# Patient Record
Sex: MALE | Race: WHITE | NOT HISPANIC OR LATINO | Employment: FULL TIME | URBAN - METROPOLITAN AREA
[De-identification: names, ages, dates, MRNs, and addresses within clinical notes are randomized per-mention and may not be internally consistent; named-entity substitution may affect disease eponyms.]

---

## 2017-01-20 ENCOUNTER — OFFICE VISIT (OUTPATIENT)
Dept: FAMILY MEDICINE | Facility: CLINIC | Age: 32
End: 2017-01-20
Payer: OTHER MISCELLANEOUS

## 2017-01-20 VITALS
DIASTOLIC BLOOD PRESSURE: 88 MMHG | HEIGHT: 71 IN | TEMPERATURE: 99.4 F | HEART RATE: 122 BPM | WEIGHT: 315 LBS | BODY MASS INDEX: 44.1 KG/M2 | SYSTOLIC BLOOD PRESSURE: 146 MMHG | OXYGEN SATURATION: 95 %

## 2017-01-20 DIAGNOSIS — M25.571 RIGHT ANKLE PAIN, UNSPECIFIED CHRONICITY: Primary | ICD-10-CM

## 2017-01-20 DIAGNOSIS — Z23 NEED FOR PROPHYLACTIC VACCINATION AND INOCULATION AGAINST INFLUENZA: ICD-10-CM

## 2017-01-20 PROCEDURE — 90686 IIV4 VACC NO PRSV 0.5 ML IM: CPT | Performed by: INTERNAL MEDICINE

## 2017-01-20 PROCEDURE — 99213 OFFICE O/P EST LOW 20 MIN: CPT | Mod: 25 | Performed by: INTERNAL MEDICINE

## 2017-01-20 PROCEDURE — 90471 IMMUNIZATION ADMIN: CPT | Performed by: INTERNAL MEDICINE

## 2017-01-20 NOTE — PATIENT INSTRUCTIONS
Try taking Aleve (naproxen) instead of the ibuprofen since Aleve lasts longer and might give you longer lasting pain control towards the end of the day.

## 2017-01-20 NOTE — PROGRESS NOTES
"  SUBJECTIVE:                                                    Zac Real is a 31 year old male who presents to clinic today for the following health issues:      ED/UC Followup:    Facility:  [facility]  Date of visit: 11/29/16  Reason for visit: rolled right ankle at work  Current Status: getting better, but still having some swelling, pain and hears a clicking noise.        Jensen injured his ankle rolling it after stepping off of a pallet at work.  It is overall improved but he still has some lateral ankle pain.  He did wear an ankle brace and use crutches for awhile.  Has since stopped.  He is not able to stay off his feet at work and his pain is worse at the end of the day.  He takes ibuprofen in the morning daily.    Problem list and histories reviewed & adjusted, as indicated.  Additional history: as documented    Current Outpatient Prescriptions   Medication Sig Dispense Refill     ibuprofen 200 MG capsule Take 800 mg by mouth every 4 hours as needed for fever       No Known Allergies    ROS:  Constitutional and MSK except as otherwise noted.    OBJECTIVE:                                                    /88 mmHg  Pulse 122  Temp(Src) 99.4  F (37.4  C) (Tympanic)  Ht 5' 11\" (1.803 m)  Wt 342 lb 12.8 oz (155.493 kg)  BMI 47.83 kg/m2  SpO2 95%  Body mass index is 47.83 kg/(m^2).  GENERAL: healthy, alert and no distress  MS: some pain and swelling anterior to the lateral malleolus of the right ankle, lots of clicking in the joint with movement of the ankle    Diagnostic Test Results:  none      ASSESSMENT/PLAN:                                                        1. Right ankle pain, subacute    Jensen continues to have lateral right ankle pain nearly two months after his injury.  He may benefit from PT and is agreeable to this, so referral made.  Recommended he try naproxen rather than ibuprofen since it may last longer and provide him with better pain relief at the end of his " work day.    - PHYSICAL THERAPY REFERRAL    2. Need for prophylactic vaccination and inoculation against influenza    - FLU VAC, SPLIT VIRUS IM > 3 YO (QUADRIVALENT) [59669]  - Vaccine Administration, Initial [40897]    Follow-up as needed.    Norm Marcelino MD  [facility]      Injectable Influenza Immunization Documentation    1.  Is the person to be vaccinated sick today?  No    2. Does the person to be vaccinated have an allergy to eggs or to a component of the vaccine?  No    3. Has the person to be vaccinated today ever had a serious reaction to influenza vaccine in the past?  No    4. Has the person to be vaccinated ever had Guillain-Castleton syndrome?  No  Answered by patient.    Form completed by Ines VASQUEZ CMA ([facility])

## 2017-01-20 NOTE — NURSING NOTE
"Chief Complaint   Patient presents with     ER F/U     work comp. seent 11/29/16, getting better, still has flare ups, clicking w/ rotating     Flu Shot       Initial /88 mmHg  Pulse 122  Temp(Src) 99.4  F (37.4  C) (Tympanic)  Ht 5' 11\" (1.803 m)  Wt 342 lb 12.8 oz (155.493 kg)  BMI 47.83 kg/m2  SpO2 95% Estimated body mass index is 47.83 kg/(m^2) as calculated from the following:    Height as of this encounter: 5' 11\" (1.803 m).    Weight as of this encounter: 342 lb 12.8 oz (155.493 kg).  BP completed using cuff size: X-maite VASQUEZ CMA ([facility])        "

## 2017-01-26 ENCOUNTER — OFFICE VISIT (OUTPATIENT)
Dept: FAMILY MEDICINE | Facility: CLINIC | Age: 32
End: 2017-01-26
Payer: COMMERCIAL

## 2017-01-26 VITALS
HEART RATE: 105 BPM | DIASTOLIC BLOOD PRESSURE: 78 MMHG | OXYGEN SATURATION: 95 % | TEMPERATURE: 98.5 F | WEIGHT: 315 LBS | SYSTOLIC BLOOD PRESSURE: 126 MMHG | BODY MASS INDEX: 48.17 KG/M2

## 2017-01-26 DIAGNOSIS — B34.9 VIRAL ILLNESS: Primary | ICD-10-CM

## 2017-01-26 PROCEDURE — 99213 OFFICE O/P EST LOW 20 MIN: CPT | Performed by: PHYSICIAN ASSISTANT

## 2017-01-26 ASSESSMENT — ENCOUNTER SYMPTOMS
DEPRESSION: 0
LOSS OF CONSCIOUSNESS: 0
EYE PAIN: 0
HALLUCINATIONS: 0
NERVOUS/ANXIOUS: 0
MYALGIAS: 0
EYE DISCHARGE: 0
DIZZINESS: 0
CONSTIPATION: 0
SEIZURES: 0
BLOOD IN STOOL: 0
BLURRED VISION: 0
COUGH: 1
WEIGHT LOSS: 0
HEADACHES: 0
INSOMNIA: 0
DOUBLE VISION: 0
SENSORY CHANGE: 0
WEAKNESS: 0
FEVER: 0
TINGLING: 0
NAUSEA: 0
ABDOMINAL PAIN: 0
DIARRHEA: 0
ORTHOPNEA: 0
BACK PAIN: 0
HEMOPTYSIS: 0
NEUROLOGICAL NEGATIVE: 1
FOCAL WEAKNESS: 0
HEARTBURN: 0
WHEEZING: 0
DYSURIA: 0
SPUTUM PRODUCTION: 0
NECK PAIN: 0
PHOTOPHOBIA: 0
SHORTNESS OF BREATH: 0
VOMITING: 0
SORE THROAT: 0
FREQUENCY: 0
DIAPHORESIS: 0
EYE REDNESS: 0
PALPITATIONS: 0

## 2017-01-26 ASSESSMENT — LIFESTYLE VARIABLES: SUBSTANCE_ABUSE: 0

## 2017-01-26 NOTE — NURSING NOTE
"Chief Complaint   Patient presents with     Cough     /78 mmHg  Pulse 105  Temp(Src) 98.5  F (36.9  C) (Tympanic)  Wt 345 lb 3.2 oz (156.582 kg)  SpO2 95% Estimated body mass index is 48.17 kg/(m^2) as calculated from the following:    Height as of 1/20/17: 5' 11\" (1.803 m).    Weight as of this encounter: 345 lb 3.2 oz (156.582 kg).  bp completed using cuff size: large      Health Maintenance that is potentially due pending provider review:  NONE    N/a  Kaden Ramirez M.A.    "

## 2017-01-26 NOTE — PROGRESS NOTES
HPI    SUBJECTIVE:                                                    Zac Real is a 31 year old male who presents to clinic today for cough that began yesterday but he has a coworker who had recently been diagnosed with bacterial bronchitis and he was concerned that he may be developing that. He denies shortness of breath, wheezing or other problems. He states he has a slight sensation in his ears.      ENT Symptoms             Symptoms: cc Present Absent Comment   Fever/Chills  x  Felt warm this morning   Fatigue  x     Muscle Aches       Eye Irritation       Sneezing       Nasal Maximino/Drg  x  Slightly this morning.  Feels a little congested   Sinus Pressure/Pain  x  Little pressure    Loss of smell       Dental pain       Sore Throat       Swollen Glands       Ear Pain/Fullness  x  Fullness for a while, some tingling    Cough x   Slight mucous   Wheeze       Chest Pain  x  Tingling when he coughs   Shortness of breath  x     Rash       Other         Symptom duration:  Yesterday    Symptom severity: worse   Treatments tried:  Mucinex this morning    Contacts:  Co-worker has viral bronchitis             Problem list and histories reviewed & adjusted, as indicated.  Additional history: as documented    Patient Active Problem List   Diagnosis     Elevated blood pressure reading without diagnosis of hypertension     Tobacco abuse     Morbid obesity (H)     Past Surgical History   Procedure Laterality Date     Nissen fundoplication         Social History   Substance Use Topics     Smoking status: Current Some Day Smoker     Types: Cigarettes, Cigars, Dip, chew, snus or snuff     Smokeless tobacco: Current User     Types: Chew     Alcohol Use: Yes      Comment: weekly     Family History   Problem Relation Age of Onset     Unknown/Adopted Father      Brain Tumor Maternal Grandmother 69     DIABETES Maternal Grandfather          Current Outpatient Prescriptions   Medication Sig Dispense Refill     ibuprofen  200 MG capsule Take 800 mg by mouth every 4 hours as needed for fever       No Known Allergies  Problem list, Medication list, Allergies, and Medical/Social/Surgical histories reviewed in [facility] and updated as appropriate.          Review of Systems   Constitutional: Negative for fever, weight loss, malaise/fatigue and diaphoresis.   HENT: Negative for congestion, ear discharge, ear pain, hearing loss, nosebleeds and sore throat.    Eyes: Negative for blurred vision, double vision, photophobia, pain, discharge and redness.   Respiratory: Positive for cough. Negative for hemoptysis, sputum production, shortness of breath and wheezing.    Cardiovascular: Negative for chest pain, palpitations, orthopnea and leg swelling.   Gastrointestinal: Negative for heartburn, nausea, vomiting, abdominal pain, diarrhea, constipation, blood in stool and melena.   Genitourinary: Negative.  Negative for dysuria, urgency and frequency.   Musculoskeletal: Negative for myalgias, back pain, joint pain and neck pain.   Skin: Negative for itching and rash.   Neurological: Negative.  Negative for dizziness, tingling, sensory change, focal weakness, seizures, loss of consciousness, weakness and headaches.   Endo/Heme/Allergies: Negative.    Psychiatric/Behavioral: Negative for depression, suicidal ideas, hallucinations and substance abuse. The patient is not nervous/anxious and does not have insomnia.          Physical Exam   Constitutional: He is oriented to person, place, and time and well-developed, well-nourished, and in no distress.   HENT:   Head: Normocephalic and atraumatic.   Right Ear: External ear normal.   Left Ear: External ear normal.   Nose: Nose normal.   Mouth/Throat: Oropharynx is clear and moist.   Bilateral cerumen impactions, irrigated until clear.   Eyes: Conjunctivae and EOM are normal. Pupils are equal, round, and reactive to light. Right eye exhibits no discharge. Left eye exhibits no discharge. No scleral icterus.    Neck: Normal range of motion. Neck supple. No thyromegaly present.   Cardiovascular: Normal rate, regular rhythm, normal heart sounds and intact distal pulses.  Exam reveals no gallop and no friction rub.    No murmur heard.  Pulmonary/Chest: Effort normal and breath sounds normal. No respiratory distress. He has no wheezes. He has no rales. He exhibits no tenderness.   Abdominal: Soft. Bowel sounds are normal. He exhibits no distension and no mass. There is no tenderness. There is no rebound and no guarding.   Musculoskeletal: Normal range of motion. He exhibits no edema or tenderness.   Lymphadenopathy:     He has no cervical adenopathy.   Neurological: He is alert and oriented to person, place, and time. He has normal reflexes. No cranial nerve deficit. He exhibits normal muscle tone. Gait normal. Coordination normal.   Skin: Skin is warm and dry. No rash noted. No erythema.   Psychiatric: Mood, memory, affect and judgment normal.       Assessment: Viral illness    Plan: Symptomatic measures and follow up if not improving

## 2017-02-06 ENCOUNTER — OFFICE VISIT (OUTPATIENT)
Dept: FAMILY MEDICINE | Facility: CLINIC | Age: 32
End: 2017-02-06
Payer: COMMERCIAL

## 2017-02-06 VITALS
SYSTOLIC BLOOD PRESSURE: 140 MMHG | WEIGHT: 315 LBS | BODY MASS INDEX: 44.1 KG/M2 | HEIGHT: 71 IN | OXYGEN SATURATION: 96 % | DIASTOLIC BLOOD PRESSURE: 100 MMHG | TEMPERATURE: 97.7 F | HEART RATE: 89 BPM

## 2017-02-06 DIAGNOSIS — J40 BRONCHITIS: Primary | ICD-10-CM

## 2017-02-06 PROCEDURE — 99213 OFFICE O/P EST LOW 20 MIN: CPT | Performed by: FAMILY MEDICINE

## 2017-02-06 RX ORDER — CODEINE PHOSPHATE AND GUAIFENESIN 10; 100 MG/5ML; MG/5ML
1-2 SOLUTION ORAL EVERY 4 HOURS PRN
Qty: 120 ML | Refills: 0 | Status: SHIPPED | OUTPATIENT
Start: 2017-02-06 | End: 2017-10-08

## 2017-02-06 RX ORDER — PREDNISONE 20 MG/1
TABLET ORAL
Qty: 20 TABLET | Refills: 0 | Status: SHIPPED | OUTPATIENT
Start: 2017-02-06 | End: 2017-10-08

## 2017-02-06 NOTE — PROGRESS NOTES
SUBJECTIVE:                                                    Zac Real is a 31 year old male who presents to clinic today for the following health issues:      Acute Illness   Acute illness concerns: cough   Onset: 1.5 weeks      Fever: YES- slight     Chills/Sweats: no     Headache (location?): no     Sinus Pressure:no    Conjunctivitis:  no    Ear Pain: no    Rhinorrhea: YES- little    Congestion: YES- chest     Sore Throat: YES- from cough     Cough: YES-non-productive, productive of clear sputum, with shortness of breath    Wheeze: YES    Decreased Appetite: yes    Nausea: no     Vomiting: no     Diarrhea:  YES- slight     Dysuria/Freq.: no     Fatigue/Achiness: YES    Sick/Strep Exposure: YES- work family     Therapies Tried and outcome: mucinex ibuprofen       Patient is a 31 yr old male who presents with a week of cough and nasal congestion. Cough is mostly dry but on occasion there is sputum production. He also reports wheezing . He reports some mild shortness of breath. No fevers or chills. He has had contacts with persons with similar illness. No other symptoms reported. He reports no history of asthma but he chews tobacco.    Problem list and histories reviewed & adjusted, as indicated.  Additional history: as documented    Patient Active Problem List   Diagnosis     Elevated blood pressure reading without diagnosis of hypertension     Tobacco abuse     Morbid obesity (H)     Past Surgical History   Procedure Laterality Date     Nissen fundoplication         Social History   Substance Use Topics     Smoking status: Current Some Day Smoker     Types: Cigarettes, Cigars, Dip, chew, snus or snuff     Smokeless tobacco: Current User     Types: Chew     Alcohol Use: Yes      Comment: weekly     Family History   Problem Relation Age of Onset     Unknown/Adopted Father      Brain Tumor Maternal Grandmother 69     DIABETES Maternal Grandfather          Current Outpatient Prescriptions  "  Medication Sig Dispense Refill     predniSONE (DELTASONE) 20 MG tablet Take 3 tabs (60 mg) by mouth daily x 3 days, 2 tabs (40 mg) daily x 3 days, 1 tab (20 mg) daily x 3 days, then 1/2 tab (10 mg) x 3 days. 20 tablet 0     guaiFENesin-codeine (ROBITUSSIN AC) 100-10 MG/5ML SOLN solution Take 5-10 mLs by mouth every 4 hours as needed 120 mL 0     ibuprofen 200 MG capsule Take 800 mg by mouth every 4 hours as needed for fever       No Known Allergies  BP Readings from Last 3 Encounters:   02/06/17 139/94   01/26/17 126/78   01/20/17 146/88    Wt Readings from Last 3 Encounters:   02/06/17 346 lb (156.945 kg)   01/26/17 345 lb 3.2 oz (156.582 kg)   01/20/17 342 lb 12.8 oz (155.493 kg)                  Labs reviewed in [facility]  Problem list, Medication list, Allergies, and Medical/Social/Surgical histories reviewed in [facility] and updated as appropriate.    ROS:  C: NEGATIVE for fever, chills, change in weight  E/M: NEGATIVE for ear, mouth and throat problems  RESP:POSITIVE for cough-non productive and wheezing  CV: NEGATIVE for chest pain, palpitations or peripheral edema    OBJECTIVE:                                                    /94 mmHg  Pulse 89  Temp(Src) 97.7  F (36.5  C) (Tympanic)  Ht 5' 11\" (1.803 m)  Wt 346 lb (156.945 kg)  BMI 48.28 kg/m2  SpO2 96%  Body mass index is 48.28 kg/(m^2).  GENERAL: healthy, alert and no distress  NECK: no adenopathy, no asymmetry, masses, or scars and thyroid normal to palpation  RESP: expiratory wheezes throughout  CV: regular rate and rhythm, normal S1 S2, no S3 or S4, no murmur, click or rub, no peripheral edema and peripheral pulses strong  ABDOMEN: soft, nontender, no hepatosplenomegaly, no masses and bowel sounds normal  MS: no gross musculoskeletal defects noted, no edema    Diagnostic Test Results:  none      ASSESSMENT/PLAN:                                                    1. Bronchitis  Likely viral, if symptoms do not improve by late in the week, patient " asked to call for antibiotics.  - predniSONE (DELTASONE) 20 MG tablet; Take 3 tabs (60 mg) by mouth daily x 3 days, 2 tabs (40 mg) daily x 3 days, 1 tab (20 mg) daily x 3 days, then 1/2 tab (10 mg) x 3 days.  Dispense: 20 tablet; Refill: 0  - guaiFENesin-codeine (ROBITUSSIN AC) 100-10 MG/5ML SOLN solution; Take 5-10 mLs by mouth every 4 hours as needed  Dispense: 120 mL; Refill: 0    FUTURE APPOINTMENTS:       - Follow-up visit as needed.    BP is elevated. Asked to return in a week or two for a recheck.     Jorge Ochoa MD  [facility]

## 2017-02-06 NOTE — PATIENT INSTRUCTIONS
Thank you for choosing [facility].  You may be receiving a survey in the mail from [employer] regarding your visit today.  Please take a few minutes to complete and return the survey to let us know how we are doing.      If you have questions or concerns, please contact us via [employer] or you can contact your care team at [phone].    Our Clinic hours are:  Monday 6:40 am  to 7:00 pm  Tuesday -Friday 6:40 am to 5:00 pm    The [place] outpatient lab hours are:  Monday - Friday 6:10 am to 4:45 pm  Saturdays 7:00 am to 11:00 am  Appointments are required, call [phone]    If you have clinical questions after hours or would like to schedule an appointment,  call the clinic at [phone].  Bronchitis with Wheezing (Viral or Bacterial: Adult)    Bronchitis is an infection of the air passages. It often occurs during the common cold and is usually caused by a virus. Symptoms include cough with mucus (phlegm) and low-grade fever. This illness is contagious during the first few days and is spread through the air by coughing and sneezing, or by direct contact (touching the sick person and then touching your own eyes, nose, or mouth).  If there is a lot of inflammation, air flow is restricted. The air passages may also go into spasm, especially if you have asthma. This causes wheezing and difficulty breathing even in people who do not have asthma.  Bronchitis usually lasts 7 to 14 days. The wheezing should improve with treatment during the first week. An inhaler is often prescribed to relax the air passages and stop wheezing. Antibiotics will be prescribed if your doctor thinks there is also a secondary bacterial infection.  Home care    If symptoms are severe, rest at home for the first 2 to 3 days. When you go back to your usual activities, don't let yourself get too tired.    Do not smoke. Also avoid being exposed to secondhand smoke.    You may use over-the-counter medicine to control fever or  pain, unless another medicine was prescribed. Note: If you have chronic liver or kidney disease or have ever had a stomach ulcer or gastrointestinal bleeding, talk with your healthcare provider before using these medicines. Also talk to your provider if you are taking medicine to prevent blood clots.) Aspirin should never be given to anyone younger than 18 years of age who is ill with a viral infection or fever. It may cause severe liver or brain damage.    Your appetite may be poor, so a light diet is fine. Avoid dehydration by drinking 6 to 8 glasses of fluids per day (such as water, soft drinks, sports drinks, juices, tea, or soup). Extra fluids will help loosen secretions in the nose and lungs.    Over-the-counter cough, cold, and sore-throat medicines will not shorten the length of the illness, but they may be helpful to reduce symptoms. (Note: Do not use decongestants if you have high blood pressure.)    If you were given an inhaler, use it exactly as directed. If you need to use it more often than prescribed, your condition may be worsening. If this happens, contact your healthcare provider.    If prescribed, finish all antibiotic medicine, even if you are feeling better after only a few days.  Follow-up care  Follow up with your healthcare provider, or as advised. If you had an X-ray or ECG (electrocardiogram), a specialist will review it. You will be notified of any new findings that may affect your care.  Note: If you are age 65 or older, or if you have a chronic lung disease or condition that affects your immune system, or you smoke, talk to your healthcare provider about having a pneumococcal vaccinations and a yearly influenza vaccination (flu shot).  When to seek medical advice   Call your healthcare provider right away if any of these occur:    Fever of 100.4 F (38 C) or higher    Coughing up increasing amounts of colored sputum    Weakness, drowsiness, headache, facial pain, ear pain, or a stiff  neck  Call 911, or get immediate medical care  Contact emergency services right away if any of these occur.    Coughing up blood    Worsening weakness, drowsiness, headache, or stiff neck    Increased wheezing not helped with medication, shortness of breath, or pain with breathing    0650-4316 [employer]. [address] [place] [id number]. All rights reserved. This information is not intended as a substitute for professional medical care. Always follow your healthcare professional's instructions.

## 2017-02-06 NOTE — MR AVS SNAPSHOT
After Visit Summary   2/6/2017    Zac Real    MRN: [id number]           Patient Information     Date Of Birth          1985        Visit Information        Provider Department      2/6/2017 7:00 AM Jorge Ochoa MD [facility]        Today's Diagnoses     Bronchitis    -  1       Care Instructions          Thank you for choosing [facility].  You may be receiving a survey in the mail from [employer] regarding your visit today.  Please take a few minutes to complete and return the survey to let us know how we are doing.      If you have questions or concerns, please contact us via [employer] or you can contact your care team at [phone].    Our Clinic hours are:  Monday 6:40 am  to 7:00 pm  Tuesday -Friday 6:40 am to 5:00 pm    The [place] outpatient lab hours are:  Monday - Friday 6:10 am to 4:45 pm  Saturdays 7:00 am to 11:00 am  Appointments are required, call [phone]    If you have clinical questions after hours or would like to schedule an appointment,  call the clinic at [phone].  Bronchitis with Wheezing (Viral or Bacterial: Adult)    Bronchitis is an infection of the air passages. It often occurs during the common cold and is usually caused by a virus. Symptoms include cough with mucus (phlegm) and low-grade fever. This illness is contagious during the first few days and is spread through the air by coughing and sneezing, or by direct contact (touching the sick person and then touching your own eyes, nose, or mouth).  If there is a lot of inflammation, air flow is restricted. The air passages may also go into spasm, especially if you have asthma. This causes wheezing and difficulty breathing even in people who do not have asthma.  Bronchitis usually lasts 7 to 14 days. The wheezing should improve with treatment during the first week. An inhaler is often prescribed to relax the air passages and stop wheezing. Antibiotics will be  prescribed if your doctor thinks there is also a secondary bacterial infection.  Home care    If symptoms are severe, rest at home for the first 2 to 3 days. When you go back to your usual activities, don't let yourself get too tired.    Do not smoke. Also avoid being exposed to secondhand smoke.    You may use over-the-counter medicine to control fever or pain, unless another medicine was prescribed. Note: If you have chronic liver or kidney disease or have ever had a stomach ulcer or gastrointestinal bleeding, talk with your healthcare provider before using these medicines. Also talk to your provider if you are taking medicine to prevent blood clots.) Aspirin should never be given to anyone younger than 18 years of age who is ill with a viral infection or fever. It may cause severe liver or brain damage.    Your appetite may be poor, so a light diet is fine. Avoid dehydration by drinking 6 to 8 glasses of fluids per day (such as water, soft drinks, sports drinks, juices, tea, or soup). Extra fluids will help loosen secretions in the nose and lungs.    Over-the-counter cough, cold, and sore-throat medicines will not shorten the length of the illness, but they may be helpful to reduce symptoms. (Note: Do not use decongestants if you have high blood pressure.)    If you were given an inhaler, use it exactly as directed. If you need to use it more often than prescribed, your condition may be worsening. If this happens, contact your healthcare provider.    If prescribed, finish all antibiotic medicine, even if you are feeling better after only a few days.  Follow-up care  Follow up with your healthcare provider, or as advised. If you had an X-ray or ECG (electrocardiogram), a specialist will review it. You will be notified of any new findings that may affect your care.  Note: If you are age 65 or older, or if you have a chronic lung disease or condition that affects your immune system, or you smoke, talk to your  "healthcare provider about having a pneumococcal vaccinations and a yearly influenza vaccination (flu shot).  When to seek medical advice   Call your healthcare provider right away if any of these occur:    Fever of 100.4 F (38 C) or higher    Coughing up increasing amounts of colored sputum    Weakness, drowsiness, headache, facial pain, ear pain, or a stiff neck  Call 911, or get immediate medical care  Contact emergency services right away if any of these occur.    Coughing up blood    Worsening weakness, drowsiness, headache, or stiff neck    Increased wheezing not helped with medication, shortness of breath, or pain with breathing    1108-5728 [employer]. [address] [place] [address]. All rights reserved. This information is not intended as a substitute for professional medical care. Always follow your healthcare professional's instructions.              Follow-ups after your visit        Who to contact     If you have questions or need follow up information about today's clinic visit or your schedule please contact [facility] directly at [phone].  Normal or non-critical lab and imaging results will be communicated to you by [employer], letter or phone within 4 business days after the clinic has received the results. If you do not hear from us within 7 days, please contact the clinic through [employer] or phone. If you have a critical or abnormal lab result, we will notify you by phone as soon as possible.  Submit refill requests through [employer] or call your pharmacy and they will forward the refill request to us. Please allow 3 business days for your refill to be completed.          Additional Information About Your Visit        [employer] Information     [employer] lets you send messages to your doctor, view your test results, renew your prescriptions, schedule appointments and more. To sign up, go to [email or web address] . Click on \"Log in\" on the left side of the " "screen, which will take you to the Welcome page. Then click on \"Sign up Now\" on the right side of the page.     You will be asked to enter the access code listed below, as well as some personal information. Please follow the directions to create your username and password.     Your access code is: [email or web address]  Expires: 2017  3:57 PM     Your access code will  in 90 days. If you need help or a new code, please call your [facility] or [phone].        Care EveryWhere ID     This is your Care EveryWhere ID. This could be used by other organizations to access your [facility] medical records  [id number]        Your Vitals Were     Pulse Temperature Height BMI (Body Mass Index) Pulse Oximetry       89 97.7  F (36.5  C) (Tympanic) 5' 11\" (1.803 m) 48.28 kg/m2 96%        Blood Pressure from Last 3 Encounters:   17 139/94   17 126/78   17 146/88    Weight from Last 3 Encounters:   17 346 lb (156.945 kg)   17 345 lb 3.2 oz (156.582 kg)   17 342 lb 12.8 oz (155.493 kg)              Today, you had the following     No orders found for display         Today's Medication Changes          These changes are accurate as of: 17  7:31 AM.  If you have any questions, ask your nurse or doctor.               Start taking these medicines.        Dose/Directions    guaiFENesin-codeine 100-10 MG/5ML Soln solution   Commonly known as:  ROBITUSSIN AC   Used for:  Bronchitis   Started by:  Jorge Ochoa MD        Dose:  1-2 tsp.   Take 5-10 mLs by mouth every 4 hours as needed   Quantity:  120 mL   Refills:  0       predniSONE 20 MG tablet   Commonly known as:  DELTASONE   Used for:  Bronchitis   Started by:  Jorge Ochoa MD        Take 3 tabs (60 mg) by mouth daily x 3 days, 2 tabs (40 mg) daily x 3 days, 1 tab (20 mg) daily x 3 days, then 1/2 tab (10 mg) x 3 days.   Quantity:  20 tablet   Refills:  0            Where to get your medicines      These " medications were sent to [facility] [place] - [place], [place] - [address]  [address] [address]     Phone:  [phone]    - predniSONE 20 MG tablet      Some of these will need a paper prescription and others can be bought over the counter.  Ask your nurse if you have questions.     Bring a paper prescription for each of these medications    - guaiFENesin-codeine 100-10 MG/5ML Soln solution             Primary Care Provider Office Phone # Fax #    Hank Nascimento [phone]       [facility] [address] [address]        Thank you!     Thank you for choosing [facility]  for your care. Our goal is always to provide you with excellent care. Hearing back from our patients is one way we can continue to improve our services. Please take a few minutes to complete the written survey that you may receive in the mail after your visit with us. Thank you!             Your Updated Medication List - Protect others around you: Learn how to safely use, store and throw away your medicines at www.disposemymeds.org.          This list is accurate as of: 2/6/17  7:31 AM.  Always use your most recent med list.                   Brand Name Dispense Instructions for use    guaiFENesin-codeine 100-10 MG/5ML Soln solution    ROBITUSSIN AC    120 mL    Take 5-10 mLs by mouth every 4 hours as needed       ibuprofen 200 MG capsule      Take 800 mg by mouth every 4 hours as needed for fever       predniSONE 20 MG tablet    DELTASONE    20 tablet    Take 3 tabs (60 mg) by mouth daily x 3 days, 2 tabs (40 mg) daily x 3 days, 1 tab (20 mg) daily x 3 days, then 1/2 tab (10 mg) x 3 days.

## 2017-02-06 NOTE — NURSING NOTE
"Chief Complaint   Patient presents with     URI       Initial /94 mmHg  Pulse 89  Temp(Src) 97.7  F (36.5  C) (Tympanic)  Ht 5' 11\" (1.803 m)  Wt 346 lb (156.945 kg)  BMI 48.28 kg/m2  SpO2 96% Estimated body mass index is 48.28 kg/(m^2) as calculated from the following:    Height as of this encounter: 5' 11\" (1.803 m).    Weight as of this encounter: 346 lb (156.945 kg).  Medication Reconciliation: complete  "

## 2017-02-20 ENCOUNTER — TELEPHONE (OUTPATIENT)
Dept: FAMILY MEDICINE | Facility: CLINIC | Age: 32
End: 2017-02-20

## 2017-02-20 NOTE — LETTER
[facility]  [address]  [facility] [address]  [phone]    February 22, 2017    Zac SHIN Farhan  [address] [address]      Dear Mr. Real,    During a recent visit to our clinic, your blood pressure was elevated above the target range for your health.  Because untreated high blood pressure could have a negative effect on your long term health.  Please schedule a free follow up blood pressure visit with one of our nurses by calling [phone].      Why is high blood pressure a big deal?      If blood pressure is elevated above target levels you have an increased risk of experiencing a heart attack or stroke, developing heart failure, kidney disease or other chronic diseases.    With appropriate early recognition and treatment, these health problems can be avoided in most cases.  Your physician can help you determine the need for treatment and discuss the non-medication and medication routes to treating high blood pressure as well as evaluate you for possible causes and effects of high blood pressure.    The target range for ideal blood pressure control is less than 140/90 for most individuals.  For those who have been diagnosed with heart disease, diabetes, stroke or peripheral vascular disease this target range is less than 130/80.          Dr. Cannon/HUMBERTO

## 2017-02-20 NOTE — TELEPHONE ENCOUNTER
----- Message from Jorge Ochoa MD sent at 2/6/2017  7:38 AM CST -----  Regarding: BP recheck  Please call patient for a BP recheck . Thanks.     Dr Ochoa

## 2017-10-08 ENCOUNTER — APPOINTMENT (OUTPATIENT)
Dept: CT IMAGING | Facility: CLINIC | Age: 32
End: 2017-10-08
Attending: EMERGENCY MEDICINE
Payer: COMMERCIAL

## 2017-10-08 ENCOUNTER — HOSPITAL ENCOUNTER (EMERGENCY)
Facility: CLINIC | Age: 32
Discharge: HOME | End: 2017-10-08
Attending: EMERGENCY MEDICINE | Admitting: EMERGENCY MEDICINE
Payer: COMMERCIAL

## 2017-10-08 VITALS
OXYGEN SATURATION: 96 % | TEMPERATURE: 98.7 F | SYSTOLIC BLOOD PRESSURE: 166 MMHG | HEART RATE: 120 BPM | BODY MASS INDEX: 50.21 KG/M2 | DIASTOLIC BLOOD PRESSURE: 113 MMHG | WEIGHT: 315 LBS | RESPIRATION RATE: 16 BRPM

## 2017-10-08 DIAGNOSIS — R22.0 LEFT FACIAL SWELLING: ICD-10-CM

## 2017-10-08 DIAGNOSIS — R51.9 NONINTRACTABLE EPISODIC HEADACHE, UNSPECIFIED HEADACHE TYPE: ICD-10-CM

## 2017-10-08 PROCEDURE — 99284 EMERGENCY DEPT VISIT MOD MDM: CPT | Mod: Z6 | Performed by: EMERGENCY MEDICINE

## 2017-10-08 PROCEDURE — 70450 CT HEAD/BRAIN W/O DYE: CPT

## 2017-10-08 PROCEDURE — 99284 EMERGENCY DEPT VISIT MOD MDM: CPT | Mod: 25 | Performed by: EMERGENCY MEDICINE

## 2017-10-08 PROCEDURE — 70486 CT MAXILLOFACIAL W/O DYE: CPT

## 2017-10-08 NOTE — DISCHARGE INSTRUCTIONS
* HEADACHE [unspecified]    The cause of your headache today is not clear, but it does not appear to be the sign of any serious illness.  Under stress, some people tense the muscles of their shoulder, neck and scalp without knowing it. If this condition lasts long enough, a TENSION HEADACHE can occur.  A MIGRAINE HEADACHE is caused by changes in blood flow to the brain. It can be mild or severe. A migraine attack may be triggered by emotional stress, hormone changes during the menstrual cycle, oral contraceptives, alcohol use, certain foods containing tyramine, eye strain, weather changes, missing meals, lack of sleep or oversleeping.  Other causes of headache include a viral illness, sinus, ear or throat infection, dental pain and TMJ (jaw joint) pain.  HOME CARE:    If you were given pain medicine for this headache, do not drive yourself home. Arrange for a ride, instead. When you get home, try to sleep. You should feel much better when you wake up.    If you are having nausea or vomiting, follow a light diet until your headache is relieved.    If you have a migraine type headache, use sunglasses when in the daylight or around bright indoor lighting until symptoms improve. Bright glaring light can worsen this kind of headache.  FOLLOW UP with your doctor if the headache is not better within the next 24 hours. If you have frequent headaches you should discuss a treatment plan with your primary care doctor. By being aware of the earliest signs of headache, and starting treatment right away, you may be able to stop the pain yourself.  GET PROMPT MEDICAL ATTENTION if any of the following occur:    Worsening of your head pain or no improvement within 24 hours    Repeated vomiting (unable to keep liquids down)    Fever over 101 F (38.3 C)    Stiff neck    Extreme drowsiness, confusion or fainting    Weakness of an arm or leg or one side of the face    Difficulty with speech or vision    6550-0257 [address]  [place] [address]. All rights reserved. This information is not intended as a substitute for professional medical care. Always follow your healthcare professional's instructions.

## 2017-10-08 NOTE — ED AVS SNAPSHOT
[facility]    [address] [address]    Phone:  [phone]    Fax:  [phone]                                       Zac Real   MRN: [id number]    Department:  [facility]   Date of Visit:  10/8/2017           After Visit Summary Signature Page     I have received my discharge instructions, and my questions have been answered. I have discussed any challenges I see with this plan with the nurse or doctor.    ..........................................................................................................................................  Patient/Patient Representative Signature      ..........................................................................................................................................  Patient Representative Print Name and Relationship to Patient    ..................................................               ................................................  Date                                            Time    ..........................................................................................................................................  Reviewed by Signature/Title    ...................................................              ..............................................  Date                                                            Time

## 2017-10-08 NOTE — ED PROVIDER NOTES
History     Chief Complaint   Patient presents with     Headache     started at 11 am today, now with facial pain, swelling     HPI  Zac Real is a 32 year old male who developed a left frontal headache approximately 3 hours ago that approximate 1 hour ago developed left facial swelling, redness and warmth.  Symptoms now spontaneously improved.  Headache is described as insidious in onset, constant, pressure-like, localized to the left frontal area and nonradiating.  No associated photophobia, nausea, vomiting, visual or neurologic deficit or abnormality.  No history of prior similar symptoms or migraine headaches.  No fever, chills, URI signs or symptoms, nasal congestion or drainage.  No dental pain.  No neck pain, neck swelling, neck stiffness.  No ear pain.  No other acute complaints or concerns.    I have reviewed the Medications, Allergies, Past Medical and Surgical History, and Social History in the Epic system.  Patient Active Problem List   Diagnosis     Elevated blood pressure reading without diagnosis of hypertension     Tobacco abuse     Morbid obesity (H)     History reviewed. No pertinent past medical history.  Past Surgical History:   Procedure Laterality Date     NISSEN FUNDOPLICATION       No current facility-administered medications for this encounter.      Current Outpatient Prescriptions   Medication     amoxicillin-clavulanate (AUGMENTIN) 875-125 MG per tablet     ibuprofen 200 MG capsule     No Known Allergies  Social History   Substance Use Topics     Smoking status: Current Some Day Smoker     Types: Cigarettes, Cigars, Dip, chew, snus or snuff     Smokeless tobacco: Current User     Types: Chew     Alcohol use Yes      Comment: weekly     Family History   Problem Relation Age of Onset     Unknown/Adopted Father      Brain Tumor Maternal Grandmother 69     DIABETES Maternal Grandfather        Review of Systems  As mentioned above in the history present illness.  All other  systems were reviewed and are negative.    Physical Exam   BP: (!) 166/113  Pulse: 120  Temp: 98.4  F (36.9  C)  Resp: 16  Weight: (!) 163.3 kg (360 lb)  SpO2: 96 %    Physical Exam   Constitutional: He is oriented to person, place, and time. He appears well-developed and well-nourished. No distress.   HENT:   Head: Normocephalic and atraumatic.   Right Ear: External ear normal.   Left Ear: External ear normal.   Nose: Nose normal.   Mouth/Throat: Oropharynx is clear and moist. No oropharyngeal exudate.   Eyes: Conjunctivae and EOM are normal. Pupils are equal, round, and reactive to light. No scleral icterus.   Neck: Normal range of motion and full passive range of motion without pain. Neck supple. Normal carotid pulses and no JVD present. Carotid bruit is not present. No rigidity. No tracheal deviation and normal range of motion present. No thyromegaly present.   Cardiovascular: Normal rate, regular rhythm, normal heart sounds and intact distal pulses.  Exam reveals no gallop and no friction rub.    No murmur heard.  Pulmonary/Chest: Effort normal and breath sounds normal. No respiratory distress. He has no wheezes. He has no rales.   Abdominal: Soft. Bowel sounds are normal. He exhibits no distension and no mass. There is no tenderness. There is no rebound and no guarding.   Musculoskeletal: Normal range of motion. He exhibits no edema or tenderness.   Neurological: He is alert and oriented to person, place, and time. He has normal strength. No cranial nerve deficit (2-12 intact). Coordination and gait normal.   Skin: Skin is warm and dry. No rash noted. He is not diaphoretic. No erythema. No pallor.   Psychiatric: He has a normal mood and affect. His behavior is normal.   Nursing note and vitals reviewed.      ED Course     ED Course     Procedures        Results for orders placed or performed during the hospital encounter of 10/08/17   CT Head w/o Contrast    Narrative    CT HEAD W/O CONTRAST   10/8/2017 5:41  PM     HISTORY: Left frontal headache    TECHNIQUE:  Axial images of the head without IV contrast material.  Radiation dose for this scan was reduced using automated exposure  control, adjustment of the mA and/or kV according to patient size, or  iterative reconstruction technique.    COMPARISON: None.    FINDINGS: The ventricles are normal in size, shape and configuration.  The brain parenchyma and subarachnoid spaces are normal. There is no  evidence of intracranial hemorrhage, mass, acute infarct or anomaly.  The visualized portions of the sinuses and mastoids appear normal.  There is no evidence of trauma.      Impression    IMPRESSION:  No acute pathology, no bleed, mass, or infarcts are seen.    PHIL BORJA MD   CT Maxillofacial w/o Contrast    Narrative     CT MAXILLOFACIAL W/O CONTRAST 10/8/2017 5:41 PM     HISTORY:  Left facial pain and swelling    TECHNIQUE:   Noncontrast axial scans and coronal reformations or axial  and direct coronal scans of the face were obtained.  Radiation dose  for this scan was reduced using automated exposure control, adjustment  of the mA and/or kV according to patient size, or iterative  reconstruction technique.    COMPARISON:  None.    FINDINGS:  The frontal sinuses, ethmoid sinuses, and sphenoid sinuses  are clear. Mild mucosal thickening is present in the left maxillary  sinus. No air-fluid level is seen. Trace mucosal thickening right  maxillary sinus. No significant soft tissue swelling is identified.      Impression    IMPRESSION:   1. Mild mucosal thickening is seen in the maxillary sinuses. No  air-fluid levels are seen.  2. No soft tissue edema is identified on these images.    PHIL BORJA MD          Labs Ordered and Resulted from Time of ED Arrival Up to the Time of Departure from the ED - No data to display    Medications - No data to display   Patient declined analgesic.  Headache is very mild at the present time.    Assessments & Plan (with Medical Decision Making)    Headache and transient left lower facial swelling and flushing with spontaneous improvement and near resolution of headache.  Neurologically intact with normal physical exam.  Symptoms may be secondary to sinusitis with only pertinent imaging finding on CT scan of the head and maxillofacial areas being mild mucosal thickening in the maxillary sinuses, L>R. Will Rx Augmentin.  Doubt emergent disease process such as aneurysm/dissection, CVA, ICH/SAH, etc.  Patient will use Tylenol and Ibuprofen prn and recheck in clinic if symptoms not resolving over next several days. Patient was provided instructions for supportive care and will return as needed for worsened condition or worsening symptoms, or new problems or concerns.    I have reviewed the nursing notes.    I have reviewed the findings, diagnosis, plan and need for follow up with the patient.      Discharge Medication List as of 10/8/2017  6:17 PM      START taking these medications    Details   amoxicillin-clavulanate (AUGMENTIN) 875-125 MG per tablet Take 1 tablet by mouth 2 times daily for 10 days, Disp-20 tablet, R-0, E-Prescribe             Final diagnoses:   Nonintractable episodic headache, unspecified headache type   Left facial swelling       10/8/2017   [facility] EMERGENCY DEPARTMENT     Angel Luis Douglas MD  10/08/17 5321

## 2017-10-08 NOTE — ED AVS SNAPSHOT
[facility] Emergency Department    [address] [facility] [address]    Phone:  [phone]    Fax:  [phone]                                       Zac Real   MRN: [id number]    Department:  [facility] Emergency Department   Date of Visit:  10/8/2017           Patient Information     Date Of Birth          1985        Your diagnoses for this visit were:     Nonintractable episodic headache, unspecified headache type     Left facial swelling        You were seen by Angel Luis Douglas MD.      Follow-up Information     Follow up with [facility] Emergency Department.    Specialty:  EMERGENCY MEDICINE    Why:  If symptoms worsen, or for new problems or concerns.    Contact information:    [address] [place] [id number] [phone]    Additional information:    The medical center is located at   [address] [place] [address] (between [address] and   [facility] in [place], four miles north   of [place]).        Follow up with Hank Nascimento MD.    Specialty:  Family Practice    Why:  For re-evaluation if symptoms not resolving over the next several days    Contact information:    [address] [place] [address]  [phone]          Discharge Instructions          * HEADACHE [unspecified]    The cause of your headache today is not clear, but it does not appear to be the sign of any serious illness.  Under stress, some people tense the muscles of their shoulder, neck and scalp without knowing it. If this condition lasts long enough, a TENSION HEADACHE can occur.  A MIGRAINE HEADACHE is caused by changes in blood flow to the brain. It can be mild or severe. A migraine attack may be triggered by emotional stress, hormone changes during the menstrual cycle, oral contraceptives, alcohol use, certain foods containing tyramine, eye strain, weather changes, missing meals, lack of sleep or oversleeping.  Other causes of headache include a viral illness, sinus, ear  or throat infection, dental pain and TMJ (jaw joint) pain.  HOME CARE:    If you were given pain medicine for this headache, do not drive yourself home. Arrange for a ride, instead. When you get home, try to sleep. You should feel much better when you wake up.    If you are having nausea or vomiting, follow a light diet until your headache is relieved.    If you have a migraine type headache, use sunglasses when in the daylight or around bright indoor lighting until symptoms improve. Bright glaring light can worsen this kind of headache.  FOLLOW UP with your doctor if the headache is not better within the next 24 hours. If you have frequent headaches you should discuss a treatment plan with your primary care doctor. By being aware of the earliest signs of headache, and starting treatment right away, you may be able to stop the pain yourself.  GET PROMPT MEDICAL ATTENTION if any of the following occur:    Worsening of your head pain or no improvement within 24 hours    Repeated vomiting (unable to keep liquids down)    Fever over 101 F (38.3 C)    Stiff neck    Extreme drowsiness, confusion or fainting    Weakness of an arm or leg or one side of the face    Difficulty with speech or vision    [address] [place] [address]. All rights reserved. This information is not intended as a substitute for professional medical care. Always follow your healthcare professional's instructions.      Discharge References/Attachments     HEADACHES, SELF-CARE FOR (ENGLISH)    HEADACHES, SINUS (ENGLISH)    HEADACHES, TENSION (ENGLISH)      24 Hour Appointment Hotline       To make an appointment at any [facility], call [phone] ([phone]). If you don't have a family doctor or clinic, we will help you find one. [facility] are conveniently located to serve the needs of you and your family.             Review of your medicines      START taking        Dose / Directions Last  dose taken    amoxicillin-clavulanate 875-125 MG per tablet   Commonly known as:  AUGMENTIN   Dose:  1 tablet   Quantity:  20 tablet        Take 1 tablet by mouth 2 times daily for 10 days   Refills:  0          Our records show that you are taking the medicines listed below. If these are incorrect, please call your family doctor or clinic.        Dose / Directions Last dose taken    ibuprofen 200 MG capsule   Dose:  800 mg        Take 800 mg by mouth every 4 hours as needed for fever   Refills:  0                Prescriptions were sent or printed at these locations (1 Prescription)                   [facility] [place], [place] - [address] [facility]   [address] [facility] [address]    Telephone:  [phone]   Fax:  [phone]   Hours:                  E-Prescribed (1 of 1)         amoxicillin-clavulanate (AUGMENTIN) 875-125 MG per tablet                Procedures and tests performed during your visit     CT Head w/o Contrast    CT Maxillofacial w/o Contrast      Orders Needing Specimen Collection     None      Pending Results     No orders found from 10/6/2017 to 10/9/2017.            Pending Culture Results     No orders found from 10/6/2017 to 10/9/2017.            Pending Results Instructions     If you had any lab results that were not finalized at the time of your Discharge, you can call the ED Lab Result RN at [phone]. You will be contacted by this team for any positive Lab results or changes in treatment. The nurses are available 7 days a week from 10A to 6:30P.  You can leave a message 24 hours per day and they will return your call.        Test Results From Your Hospital Stay        10/8/2017  6:05 PM      Narrative     CT HEAD W/O CONTRAST   10/8/2017 5:41 PM     HISTORY: Left frontal headache    TECHNIQUE:  Axial images of the head without IV contrast material.  Radiation dose for this scan was reduced using automated exposure  control, adjustment of the mA and/or kV according to  patient size, or  iterative reconstruction technique.    COMPARISON: None.    FINDINGS: The ventricles are normal in size, shape and configuration.  The brain parenchyma and subarachnoid spaces are normal. There is no  evidence of intracranial hemorrhage, mass, acute infarct or anomaly.  The visualized portions of the sinuses and mastoids appear normal.  There is no evidence of trauma.        Impression     IMPRESSION:  No acute pathology, no bleed, mass, or infarcts are seen.    PHIL BORJA MD         10/8/2017  6:06 PM      Narrative      CT MAXILLOFACIAL W/O CONTRAST 10/8/2017 5:41 PM     HISTORY:  Left facial pain and swelling    TECHNIQUE:   Noncontrast axial scans and coronal reformations or axial  and direct coronal scans of the face were obtained.  Radiation dose  for this scan was reduced using automated exposure control, adjustment  of the mA and/or kV according to patient size, or iterative  reconstruction technique.    COMPARISON:  None.    FINDINGS:  The frontal sinuses, ethmoid sinuses, and sphenoid sinuses  are clear. Mild mucosal thickening is present in the left maxillary  sinus. No air-fluid level is seen. Trace mucosal thickening right  maxillary sinus. No significant soft tissue swelling is identified.        Impression     IMPRESSION:   1. Mild mucosal thickening is seen in the maxillary sinuses. No  air-fluid levels are seen.  2. No soft tissue edema is identified on these images.    PHIL BORJA MD                Thank you for choosing [facility]       Thank you for choosing [facility] for your care. Our goal is always to provide you with excellent care. Hearing back from our patients is one way we can continue to improve our services. Please take a few minutes to complete the written survey that you may receive in the mail after you visit with us. Thank you!        [employer] Information     [employer] lets you send messages to your doctor, view your test results, renew your prescriptions, schedule  "appointments and more. To sign up, go to [email or web address] . Click on \"Log in\" on the left side of the screen, which will take you to the Welcome page. Then click on \"Sign up Now\" on the right side of the page.     You will be asked to enter the access code listed below, as well as some personal information. Please follow the directions to create your username and password.     Your access code is: [email or web address]  Expires: 2018  6:17 PM     Your access code will  in 90 days. If you need help or a new code, please call your [place] clinic or [phone].        Care EveryWhere ID     This is your Care EveryWhere ID. This could be used by other organizations to access your [facility] medical records  [id number]        Equal Access to Services     DIONISIO MARTINEZ : Radha Harper, jeanette cordova, sara hammond, nigel brown. So [facility] [phone].    ATENCIÓN: Si habla español, tiene a james disposición servicios gratuitos de asistencia lingüística. Llame al [phone].    We comply with applicable federal civil rights laws and [place] laws. We do not discriminate on the basis of race, color, national origin, age, disability, sex, sexual orientation, or gender identity.            After Visit Summary       This is your record. Keep this with you and show to your community pharmacist(s) and doctor(s) at your next visit.                  "

## 2017-10-08 NOTE — ED NOTES
"3 hours ago onset of frontal HA. \"sinus area\".  about 1 hour ago noted swelling to left cheek, slight redness. No ear pain, no ST. Has had slight URI sx. Has had some dental issues  "

## 2017-11-04 ENCOUNTER — RADIANT APPOINTMENT (OUTPATIENT)
Dept: GENERAL RADIOLOGY | Facility: CLINIC | Age: 32
End: 2017-11-04
Attending: NURSE PRACTITIONER
Payer: COMMERCIAL

## 2017-11-04 ENCOUNTER — OFFICE VISIT (OUTPATIENT)
Dept: FAMILY MEDICINE | Facility: CLINIC | Age: 32
End: 2017-11-04
Payer: COMMERCIAL

## 2017-11-04 VITALS
BODY MASS INDEX: 50.29 KG/M2 | HEART RATE: 122 BPM | WEIGHT: 315 LBS | OXYGEN SATURATION: 94 % | SYSTOLIC BLOOD PRESSURE: 153 MMHG | DIASTOLIC BLOOD PRESSURE: 101 MMHG | TEMPERATURE: 97.9 F

## 2017-11-04 DIAGNOSIS — M25.532 LEFT WRIST PAIN: ICD-10-CM

## 2017-11-04 DIAGNOSIS — M70.832 REPETITIVE MOTION DISEASE OF LEFT WRIST: ICD-10-CM

## 2017-11-04 DIAGNOSIS — M25.532 LEFT WRIST PAIN: Primary | ICD-10-CM
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
## 2017-11-04 PROCEDURE — 73110 X-RAY EXAM OF WRIST: CPT | Mod: LT

## 2017-11-04 PROCEDURE — 99213 OFFICE O/P EST LOW 20 MIN: CPT | Performed by: NURSE PRACTITIONER

## 2017-11-04 NOTE — MR AVS SNAPSHOT
After Visit Summary   11/4/2017    Zac Real    MRN: [id number]           Patient Information     Date Of Birth          1985        Visit Information        Provider Department      11/4/2017 11:30 AM Morena Leonard APRN [facility]        Today's Diagnoses     Left wrist pain    -  1    Repetitive motion disease of left wrist          Care Instructions    Wrist splint as directed.    Ibuprofen as needed not to exceed 2400 mg in 24 hours.    See occupational therapy for evaluation. Referral has been made.    Follow-up with your primary care provider in 1-2 weeks and as needed.    Indications for emergent return to emergency department discussed with patient, who verbalized good understanding and agreement.  Patient understands the limitations of today's evaluation.         Carpal Tunnel Syndrome    Carpal tunnel syndrome is a painful condition of the wrist and arm. It is caused by pressure on the median nerve.  The median nerve is one of the nerves that give feeling and movement to the hand. It passes through a tunnel in the wrist called the carpal tunnel. This tunnel is made up of bones and ligaments. Narrowing of this tunnel or swelling of the tissues inside the tunnel puts pressure on the median nerve. This causes numbness, pins and needles, or electric shooting pains in your hand and forearm. Often the pain is worse at night and may wake you when you are asleep.  Carpal tunnel syndrome may occur during pregnancy and with use of birth control pills. It is more common in workers who must often bend their wrists. It is also common in people who work with power tools that cause strong vibrations.  Home care    Rest the painful wrist. Avoid repeated bending of the wrist back and forth. This puts pressure on the median nerve. Avoid using power tools with strong vibrations.    If you were given a splint, wear it at night while you sleep. You may also wear  it during the day for comfort.    Move your fingers and wrists often to avoid stiffness.    Elevate your arms on pillows when you lie down.    Try using the unaffected hand more.    Try not to hold your wrists in a bent, downward position.    Sometimes changes in the work place may ease symptoms. If you type most of the day, it may help to change the position of your keyboard or add a wrist support. Your wrist should be in a neutral position and not bent back when typing.    You may use over-the-counter pain medicine to treat pain and inflammation, unless another medicine was prescribed. Anti-inflammatory pain medicines, such as ibuprofen or naproxen may be more effective than acetaminophen, which treats pain, but not inflammation. If you have chronic liver or kidney disease or ever had a stomach ulcer or GI bleeding, talk with your doctor before using these medicines.    Opioid pain medicine will only give temporary relief and does not treat the problem. If pain continues, you may need a shot of a steroid drug into your wrist.    If the above methods fail, you may need surgery. This will open the carpal tunnel and release the pressure on the trapped nerve.  Follow-up care  Follow up with your healthcare provider, or as advised, if the pain doesn t begin to improve within the next week.  If X-rays were taken, you will be notified of any new findings that may affect your care.  When to seek medical advice  Call your healthcare provider right away if any of these occur:    Pain not improving with the above treatment    Fingers or hand become cold, blue, numb, or tingly    Your whole arm becomes swollen or weak  Date Last Reviewed: 11/23/2015 2000-2017 [employer]. [address] [place] [address], [place] [address]. All rights reserved. This information is not intended as a substitute for professional medical care. Always follow your healthcare professional's instructions.                Follow-ups after your  "visit        Additional Services     OCCUPATIONAL THERAPY REFERRAL       *This therapy referral will be filtered to a centralized scheduling office at [facility] and the patient will receive a call to schedule an appointment at a [facility] location most convenient for them. *     [facility] provides Occupational Therapy evaluation and treatment and many specialty services across the [facility] system.  If requesting a specialty program, please choose from the list below.    If you have not heard from the scheduling office within 2 business days, please call [phone] for all locations, with the exception of [place], please call [phone].     Treatment: Evaluation & Treatment  Special Instructions/Modalities: eval and treat left wrist pain, possible carpal tunnel.  Special Programs: None    Please be aware that coverage of these services is subject to the terms and limitations of your health insurance plan.  Call member services at your health plan with any benefit or coverage questions.      **Note to Provider:  If you are referring outside of [facility] for the therapy appointment, please list the name of the location in the \"special instructions\" above, print the referral and give to the patient to schedule the appointment.                  Follow-up notes from your care team     See patient instructions section of the AVS Return in about 1 week (around 11/11/2017) for Follow up with your primary care provider.      Who to contact     If you have questions or need follow up information about today's clinic visit or your schedule please contact [facility] directly at [phone].  Normal or non-critical lab and imaging results will be communicated to you by MyChart, letter or phone within 4 business days after the clinic has received the results. If you do not hear from us within 7 days, please contact the clinic through MyChart or phone. If you have a " "critical or abnormal lab result, we will notify you by phone as soon as possible.  Submit refill requests through "[employer] or call your pharmacy and they will forward the refill request to us. Please allow 3 business days for your refill to be completed.          Additional Information About Your Visit        [facility] Information     "[employer] lets you send messages to your doctor, view your test results, renew your prescriptions, schedule appointments and more. To sign up, go to [email or web address] . Click on \"Log in\" on the left side of the screen, which will take you to the Welcome page. Then click on \"Sign up Now\" on the right side of the page.     You will be asked to enter the access code listed below, as well as some personal information. Please follow the directions to create your username and password.     Your access code is: [email or web address]  Expires: 2018  6:17 PM     Your access code will  in 90 days. If you need help or a new code, please call your [place] clinic or [phone].        Care EveryWhere ID     This is your Care EveryWhere ID. This could be used by other organizations to access your [place] medical records  [id number]        Your Vitals Were     Pulse Temperature Pulse Oximetry BMI (Body Mass Index)          122 97.9  F (36.6  C) (Tympanic) 94% 50.29 kg/m2         Blood Pressure from Last 3 Encounters:   17 (!) 153/101   10/08/17 (!) 166/113   17 (!) 140/100    Weight from Last 3 Encounters:   17 (!) 360 lb 9.6 oz (163.6 kg)   10/08/17 (!) 360 lb (163.3 kg)   17 (!) 346 lb (156.9 kg)              We Performed the Following     OCCUPATIONAL THERAPY REFERRAL          Today's Medication Changes          These changes are accurate as of: 17 12:38 PM.  If you have any questions, ask your nurse or doctor.               Start taking these medicines.        Dose/Directions    order for DME   Used for:  Repetitive motion disease of left wrist   Started by:  " Morena Leonard, ROB CNP        Left wrist splint   Quantity:  1 Device   Refills:  0            Where to get your medicines      Some of these will need a paper prescription and others can be bought over the counter.  Ask your nurse if you have questions.     Bring a paper prescription for each of these medications     order for DME                Primary Care Provider Office Phone # Fax #    Hank Nascimento [phone] [address] [facility] [address]        Equal Access to Services     DIONISIO MARTINEZ : Hadii aad ku hadasho Soomaali, waaxda luqadaha, qaybta kaalmada adeegyada, waxay idiin hayaan adeeg kharacarolee lakenneth . So [facility] [phone].    ATENCIÓN: Si habla español, tiene a james disposición servicios gratuitos de asistencia lingüística. Llame al [phone].    We comply with applicable federal civil rights laws and [place] laws. We do not discriminate on the basis of race, color, national origin, age, disability, sex, sexual orientation, or gender identity.            Thank you!     Thank you for choosing [facility]  for your care. Our goal is always to provide you with excellent care. Hearing back from our patients is one way we can continue to improve our services. Please take a few minutes to complete the written survey that you may receive in the mail after your visit with us. Thank you!             Your Updated Medication List - Protect others around you: Learn how to safely use, store and throw away your medicines at www.disposemymeds.org.          This list is accurate as of: 11/4/17 12:38 PM.  Always use your most recent med list.                   Brand Name Dispense Instructions for use Diagnosis    ibuprofen 200 MG capsule      Take 800 mg by mouth every 4 hours as needed for fever        order for DME     1 Device    Left wrist splint    Repetitive motion disease of left wrist

## 2017-11-04 NOTE — PATIENT INSTRUCTIONS
Wrist splint as directed.    Ibuprofen as needed not to exceed 2400 mg in 24 hours.    See occupational therapy for evaluation. Referral has been made.    Follow-up with your primary care provider in 1-2 weeks and as needed.    Indications for emergent return to emergency department discussed with patient, who verbalized good understanding and agreement.  Patient understands the limitations of today's evaluation.         Carpal Tunnel Syndrome    Carpal tunnel syndrome is a painful condition of the wrist and arm. It is caused by pressure on the median nerve.  The median nerve is one of the nerves that give feeling and movement to the hand. It passes through a tunnel in the wrist called the carpal tunnel. This tunnel is made up of bones and ligaments. Narrowing of this tunnel or swelling of the tissues inside the tunnel puts pressure on the median nerve. This causes numbness, pins and needles, or electric shooting pains in your hand and forearm. Often the pain is worse at night and may wake you when you are asleep.  Carpal tunnel syndrome may occur during pregnancy and with use of birth control pills. It is more common in workers who must often bend their wrists. It is also common in people who work with power tools that cause strong vibrations.  Home care    Rest the painful wrist. Avoid repeated bending of the wrist back and forth. This puts pressure on the median nerve. Avoid using power tools with strong vibrations.    If you were given a splint, wear it at night while you sleep. You may also wear it during the day for comfort.    Move your fingers and wrists often to avoid stiffness.    Elevate your arms on pillows when you lie down.    Try using the unaffected hand more.    Try not to hold your wrists in a bent, downward position.    Sometimes changes in the work place may ease symptoms. If you type most of the day, it may help to change the position of your keyboard or add a wrist support. Your wrist should be  in a neutral position and not bent back when typing.    You may use over-the-counter pain medicine to treat pain and inflammation, unless another medicine was prescribed. Anti-inflammatory pain medicines, such as ibuprofen or naproxen may be more effective than acetaminophen, which treats pain, but not inflammation. If you have chronic liver or kidney disease or ever had a stomach ulcer or GI bleeding, talk with your doctor before using these medicines.    Opioid pain medicine will only give temporary relief and does not treat the problem. If pain continues, you may need a shot of a steroid drug into your wrist.    If the above methods fail, you may need surgery. This will open the carpal tunnel and release the pressure on the trapped nerve.  Follow-up care  Follow up with your healthcare provider, or as advised, if the pain doesn t begin to improve within the next week.  If X-rays were taken, you will be notified of any new findings that may affect your care.  When to seek medical advice  Call your healthcare provider right away if any of these occur:    Pain not improving with the above treatment    Fingers or hand become cold, blue, numb, or tingly    Your whole arm becomes swollen or weak  Date Last Reviewed: 11/23/2015 2000-2017 [employer]. [address] [place] [address], [place] [address]. All rights reserved. This information is not intended as a substitute for professional medical care. Always follow your healthcare professional's instructions.

## 2017-11-04 NOTE — PROGRESS NOTES
SUBJECTIVE:   Zac Real is a 32 year old male who presents to clinic today for the following health issues:      Chief Complaint   Patient presents with     Musculoskeletal Problem     left wrist pain, since monday, no injury, started swelling, swelling is moving and increasing, painful            Problem list and histories reviewed & adjusted, as indicated.  Additional history: as documented    Patient Active Problem List   Diagnosis     Elevated blood pressure reading without diagnosis of hypertension     Tobacco abuse     Morbid obesity (H)     Past Surgical History:   Procedure Laterality Date     NISSEN FUNDOPLICATION         Social History   Substance Use Topics     Smoking status: Current Some Day Smoker     Types: Cigarettes, Cigars, Dip, chew, snus or snuff     Smokeless tobacco: Current User     Types: Chew     Alcohol use Yes      Comment: weekly     Family History   Problem Relation Age of Onset     Unknown/Adopted Father      Brain Tumor Maternal Grandmother 69     DIABETES Maternal Grandfather          Current Outpatient Prescriptions   Medication Sig Dispense Refill     order for DME Left wrist splint 1 Device 0     ibuprofen 200 MG capsule Take 800 mg by mouth every 4 hours as needed for fever       No Known Allergies  Labs reviewed in EPIC      Reviewed and updated as needed this visit by clinical staffTobacco  Allergies  Meds  Problems  Med Hx  Surg Hx  Fam Hx  Soc Hx        Reviewed and updated as needed this visit by Provider  Allergies  Meds  Problems         ROS:  Constitutional, HEENT, cardiovascular, pulmonary, GI, , musculoskeletal, neuro, skin, endocrine and psych systems are negative, except as otherwise noted.      OBJECTIVE:   BP (!) 153/101  Pulse 122  Temp 97.9  F (36.6  C) (Tympanic)  Wt (!) 360 lb 9.6 oz (163.6 kg)  SpO2 94%  BMI 50.29 kg/m2  Body mass index is 50.29 kg/(m^2).   GENERAL: healthy, alert and no distress, nontoxic in appearance  EYES:  Eyes grossly normal to inspection, PERRL and conjunctivae and sclerae normal  HENT: normocephalic and atraumatic  NECK: supple with full ROM  MS: Left wrist pain with positive Phalen sign. No swelling of wrist or forearm but knuckles are a bit puffy.  Good . Pain is primarily on dorsal aspect of mid-wrist. Also wakes him at night. No erythema. Xray appears negative and will follow up with over read as indicated.        Diagnostic Test Results:XR WRIST LEFT G/E 3 VIEWS  11/4/2017 12:22 PM     HISTORY:  Pain.     COMPARISON:  None.         IMPRESSION:  Negative.       ASHISH MANRIQUE MD  No results found for this or any previous visit (from the past 24 hour(s)).    ASSESSMENT/PLAN:   Pt is a . This may be carpal tunnel of left wrist or at least a repetitive motion injury. Will let OT assess more indepth and follow up as directed by OT.  He will wear velcro wrist splint at this time to see if it will relieve some discomfort. Also instructed patient that he needs to follow up with his PCP for his high blood pressure. He has been ignoring this for some time. He is quite a large man and lifestyle changes may help but in the mean time he needs to have this blood pressure managed. Also told him to not take anymore than 2400 mg of Ibuprofen in 24 hours. He states he does not exceed 1000 mg per day even though his chart states 800 mg q 4 hours. Educated him that this was indeed too much and could harm his kidneys as well as retain fluid which can in turn not be good for elevated blood pressure.  Problem List Items Addressed This Visit     None      Visit Diagnoses     Left wrist pain    -  Primary    Relevant Orders    XR Wrist Left G/E 3 Views (Completed)    OCCUPATIONAL THERAPY REFERRAL    Repetitive motion disease of left wrist        Relevant Medications    order for DME    Other Relevant Orders    OCCUPATIONAL THERAPY REFERRAL               Patient Instructions   Wrist splint as directed.    Ibuprofen as needed  not to exceed 2400 mg in 24 hours.    See occupational therapy for evaluation. Referral has been made.    Follow-up with your primary care provider in 1-2 weeks and as needed.    Indications for emergent return to emergency department discussed with patient, who verbalized good understanding and agreement.  Patient understands the limitations of today's evaluation.         Carpal Tunnel Syndrome    Carpal tunnel syndrome is a painful condition of the wrist and arm. It is caused by pressure on the median nerve.  The median nerve is one of the nerves that give feeling and movement to the hand. It passes through a tunnel in the wrist called the carpal tunnel. This tunnel is made up of bones and ligaments. Narrowing of this tunnel or swelling of the tissues inside the tunnel puts pressure on the median nerve. This causes numbness, pins and needles, or electric shooting pains in your hand and forearm. Often the pain is worse at night and may wake you when you are asleep.  Carpal tunnel syndrome may occur during pregnancy and with use of birth control pills. It is more common in workers who must often bend their wrists. It is also common in people who work with power tools that cause strong vibrations.  Home care    Rest the painful wrist. Avoid repeated bending of the wrist back and forth. This puts pressure on the median nerve. Avoid using power tools with strong vibrations.    If you were given a splint, wear it at night while you sleep. You may also wear it during the day for comfort.    Move your fingers and wrists often to avoid stiffness.    Elevate your arms on pillows when you lie down.    Try using the unaffected hand more.    Try not to hold your wrists in a bent, downward position.    Sometimes changes in the work place may ease symptoms. If you type most of the day, it may help to change the position of your keyboard or add a wrist support. Your wrist should be in a neutral position and not bent back when  typing.    You may use over-the-counter pain medicine to treat pain and inflammation, unless another medicine was prescribed. Anti-inflammatory pain medicines, such as ibuprofen or naproxen may be more effective than acetaminophen, which treats pain, but not inflammation. If you have chronic liver or kidney disease or ever had a stomach ulcer or GI bleeding, talk with your doctor before using these medicines.    Opioid pain medicine will only give temporary relief and does not treat the problem. If pain continues, you may need a shot of a steroid drug into your wrist.    If the above methods fail, you may need surgery. This will open the carpal tunnel and release the pressure on the trapped nerve.  Follow-up care  Follow up with your healthcare provider, or as advised, if the pain doesn t begin to improve within the next week.  If X-rays were taken, you will be notified of any new findings that may affect your care.  When to seek medical advice  Call your healthcare provider right away if any of these occur:    Pain not improving with the above treatment    Fingers or hand become cold, blue, numb, or tingly    Your whole arm becomes swollen or weak  Date Last Reviewed: 11/23/2015 2000-2017 [employer] [address] [place] [address], [place] [address]. All rights reserved. This information is not intended as a substitute for professional medical care. Always follow your healthcare professional's instructions.            ROB Mattson [facility]

## 2017-12-28 ENCOUNTER — TELEPHONE (OUTPATIENT)
Dept: NURSING | Facility: CLINIC | Age: 32
End: 2017-12-28

## 2017-12-28 NOTE — TELEPHONE ENCOUNTER
Spoke with patient briefly, but he was unable to talk for long. Will try again in the coming days around the lunch hour.

## 2018-09-04 ENCOUNTER — HOSPITAL ENCOUNTER (EMERGENCY)
Facility: CLINIC | Age: 33
Discharge: HOME | End: 2018-09-04
Attending: FAMILY MEDICINE | Admitting: FAMILY MEDICINE
Payer: COMMERCIAL

## 2018-09-04 ENCOUNTER — APPOINTMENT (OUTPATIENT)
Dept: GENERAL RADIOLOGY | Facility: CLINIC | Age: 33
End: 2018-09-04
Attending: FAMILY MEDICINE
Payer: COMMERCIAL

## 2018-09-04 VITALS
DIASTOLIC BLOOD PRESSURE: 127 MMHG | TEMPERATURE: 98.1 F | BODY MASS INDEX: 41.75 KG/M2 | HEIGHT: 73 IN | SYSTOLIC BLOOD PRESSURE: 163 MMHG | OXYGEN SATURATION: 95 % | WEIGHT: 315 LBS | RESPIRATION RATE: 18 BRPM

## 2018-09-04 DIAGNOSIS — R07.89 CHEST WALL PAIN: ICD-10-CM

## 2018-09-04 DIAGNOSIS — R03.0 ELEVATED BLOOD PRESSURE READING WITHOUT DIAGNOSIS OF HYPERTENSION: ICD-10-CM

## 2018-09-04 PROCEDURE — 71101 X-RAY EXAM UNILAT RIBS/CHEST: CPT | Mod: LT

## 2018-09-04 PROCEDURE — 99284 EMERGENCY DEPT VISIT MOD MDM: CPT | Performed by: FAMILY MEDICINE

## 2018-09-04 PROCEDURE — 93010 ELECTROCARDIOGRAM REPORT: CPT | Mod: Z6 | Performed by: FAMILY MEDICINE

## 2018-09-04 PROCEDURE — 99284 EMERGENCY DEPT VISIT MOD MDM: CPT | Mod: 25 | Performed by: FAMILY MEDICINE

## 2018-09-04 PROCEDURE — 93005 ELECTROCARDIOGRAM TRACING: CPT | Performed by: FAMILY MEDICINE

## 2018-09-04 RX ORDER — HYDROCODONE BITARTRATE AND ACETAMINOPHEN 5; 325 MG/1; MG/1
1 TABLET ORAL EVERY 4 HOURS PRN
Qty: 18 TABLET | Refills: 0 | Status: SHIPPED | OUTPATIENT
Start: 2018-09-04 | End: 2022-11-14

## 2018-09-04 NOTE — ED NOTES
Left mid back pain x 1 month-past week pain wraps around to left chest at times-especially with rom and sitting up in bed in the morning   Mid back is tender to palpation-pt is a  and works for long periods with arms held over his head

## 2018-09-04 NOTE — ED AVS SNAPSHOT
[facility]    [address] [facility] [address]    Phone:  [phone]    Fax:  [phone]                                       Zac Real   MRN: [id number]    Department:  [facility]   Date of Visit:  9/4/2018           After Visit Summary Signature Page     I have received my discharge instructions, and my questions have been answered. I have discussed any challenges I see with this plan with the nurse or doctor.    ..........................................................................................................................................  Patient/Patient Representative Signature      ..........................................................................................................................................  Patient Representative Print Name and Relationship to Patient    ..................................................               ................................................  Date                                            Time    ..........................................................................................................................................  Reviewed by Signature/Title    ...................................................              ..............................................  Date                                                            Time          22EPIC Rev 08/18

## 2018-09-04 NOTE — ED PROVIDER NOTES
"  History     Chief Complaint   Patient presents with     Back Pain     in left shoulder blade area, thinks he pulled a muscle, now it's wrapping around to the front of his chest; progressing over the past 1 month     HPI  Zac Real is a 32 year old male, past medical history significant for hypertension, tobacco abuse, morbid obesity, presents the emergency department with concerns of left scapular area pain.  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  The pain is persisted for a month is worse with movement at the left shoulder and use of the back muscles.  he also gets an occasional sharp shooting stabbing \"charley horse\" type sensation radiating around to his left anterior chest on occasion as well.  There is no associated shortness of breath lightheadedness nausea or vomiting.  The pain worsens with movement and not exertion such as ambulation or going up or downstairs.      Problem List:    Patient Active Problem List    Diagnosis Date Noted     Elevated blood pressure reading without diagnosis of hypertension 03/10/2015     Priority: Medium     Tobacco abuse 03/10/2015     Priority: Medium     Morbid obesity (H) 03/10/2015     Priority: Medium        Past Medical History:    No past medical history on file.    Past Surgical History:    Past Surgical History:   Procedure Laterality Date     NISSEN FUNDOPLICATION         Family History:    Family History   Problem Relation Age of Onset     Unknown/Adopted Father      Brain Tumor Maternal Grandmother 69     Diabetes Maternal Grandfather        Social History:  Marital Status:  Single [1]  Social History   Substance Use Topics     Smoking status: Current Some Day Smoker     Types: Cigarettes, Cigars, Dip, chew, snus or snuff     Smokeless tobacco: Current User     Types: Chew     Alcohol " "use Yes      Comment: weekly        Medications:      HYDROcodone-acetaminophen (NORCO) 5-325 MG per tablet   ibuprofen 200 MG capsule         Review of Systems   All other systems reviewed and are negative.      Physical Exam   BP: (!) 182/98  Heart Rate: 116  Temp: 98.1  F (36.7  C)  Resp: 18  Height: 185.4 cm (6' 1\")  Weight: (!) 163.3 kg (360 lb)  SpO2: 96 %      Physical Exam   Constitutional: He appears well-developed and well-nourished.   HENT:   Head: Normocephalic and atraumatic.   Right Ear: External ear normal.   Left Ear: External ear normal.   Nose: Nose normal.   Mouth/Throat: Oropharynx is clear and moist.   Eyes: Conjunctivae and EOM are normal. Pupils are equal, round, and reactive to light.   Neck: Normal range of motion. Neck supple.   Cardiovascular: Normal rate, regular rhythm, normal heart sounds and intact distal pulses.    Pulmonary/Chest: Effort normal and breath sounds normal.           Nursing note and vitals reviewed.      ED Course     ED Course     Procedures               EKG Interpretation:      Interpreted by Ubaldo Lund  Time reviewed: 13:33  Symptoms at time of EKG: L infrascapular  Area pain  Rhythm: sinus tachycardia  Rate: 114  Axis: Normal  Ectopy: none  Conduction: normal  ST Segments/ T Waves: No ST-T wave changes and No acute ischemic changes  Q Waves: none  Comparison to prior: No old EKG available    Clinical Impression: sinus tachycardia      Results for orders placed or performed during the hospital encounter of 09/04/18   XR Ribs & Chest Lt 3v    Narrative    LEFT RIBS AND CHEST THREE VIEWS   9/4/2018 2:40 PM     HISTORY: Left periscapular pain.     COMPARISON: None.      Impression    IMPRESSION: Lungs are hypoinflated with basilar opacities favored  represent atelectasis. No displaced rib fractures are identified;  however, oblique views are limited due to technique/habitus.    SHASHA SAUCEDO MD               Critical Care time:  none               No " results found for this or any previous visit (from the past 24 hour(s)).    Medications - No data to display    Assessments & Plan (with Medical Decision Making)   32-year-old male past medical history reviewed as above presents for evaluation of approximately a month of left parascapular area pain with occasional radiation more anteriorly.  His exam finds him alert no acute distress pleasant blood pressure elevated noted and discussed with him.  Tenderness as diagrammed and increased with movement.  EKG is unremarkable other than sinus tachycardia.  Chest x-ray reveals pulmonary hypoinflation likely basilar atelectasis no displaced rib or rib fractures are identified.  This does appear to what most likely musculoskeletal, in the absence of associated features of more serious explanation and are unlikely in a patient this age even given his body habitus considerations.  I like him to warm pack and treat symptomatically with continued nonnarcotic pain medication as well as sparing use of Norco as needed for breakthrough pain.  If this pain is not improving over the next week to 10 days with decreased use of his left arm I would like him to follow-up further in clinic.  If he experiences a significant additional associated symptoms of the symptoms change I would like him to return to the emergency department    Disclaimer: This note consists of symbols derived from keyboarding, dictation and/or voice recognition software. As a result, there may be errors in the script that have gone undetected. Please consider this when interpreting information found in this chart.      I have reviewed the nursing notes.    I have reviewed the findings, diagnosis, plan and need for follow up with the patient.       Discharge Medication List as of 9/4/2018  3:28 PM      START taking these medications    Details   HYDROcodone-acetaminophen (NORCO) 5-325 MG per tablet Take 1 tablet by mouth every 4 hours as needed for pain, Disp-18  tablet, R-0, Local Print             Final diagnoses:   Chest wall pain - muscle strain   Elevated blood pressure reading without diagnosis of hypertension       9/4/2018   [facility] EMERGENCY DEPARTMENT     Ubaldo Lund MD  09/05/18 2047

## 2018-09-04 NOTE — DISCHARGE INSTRUCTIONS
Warm pack and rest to the affected area next 7-10 days.  Ibuprofen may continue 800 mg 3 times a day with food.  Also may try acetaminophen 1 g every 4 hours maximum 4 g in 24 hours.  Cautious use of Vicodin 1 tablet every 6 hours as needed for pain not responsive to ibuprofen or acetaminophen.  He should not drive or operate machinery or perform any tasks requiring fine motor skills of using this medicine.  If not improving over the course the next 7-10 days consider follow-up in clinic with your primary care provider.  Please follow-up with the primary care provider with respect to blood pressure.

## 2018-09-04 NOTE — ED AVS SNAPSHOT
[facility] Emergency Department    [address] [address]    Phone:  [phone]    Fax:  [phone]                                       Zac Real   MRN: [id number]    Department:  [facility] Emergency Department   Date of Visit:  9/4/2018           Patient Information     Date Of Birth          1985        Your diagnoses for this visit were:     Chest wall pain muscle strain    Elevated blood pressure reading without diagnosis of hypertension        You were seen by Ubaldo Lund MD.      Follow-up Information     Schedule an appointment as soon as possible for a visit with Hank Nascimento MD.    Specialty:  Family Practice    Why:  As needed, If symptoms worsen    Contact information:    [address] [facility] [facility] [address]  [phone]          Discharge Instructions       Warm pack and rest to the affected area next 7-10 days.  Ibuprofen may continue 800 mg 3 times a day with food.  Also may try acetaminophen 1 g every 4 hours maximum 4 g in 24 hours.  Cautious use of Vicodin 1 tablet every 6 hours as needed for pain not responsive to ibuprofen or acetaminophen.  He should not drive or operate machinery or perform any tasks requiring fine motor skills of using this medicine.  If not improving over the course the next 7-10 days consider follow-up in clinic with your primary care provider.  Please follow-up with the primary care provider with respect to blood pressure.      24 Hour Appointment Hotline       To make an appointment at any [facility], call [phone] ([phone]). If you don't have a family doctor or clinic, we will help you find one. [facility] clinics are conveniently located to serve the needs of you and your family.             Review of your medicines      START taking        Dose / Directions Last dose taken    HYDROcodone-acetaminophen 5-325 MG per tablet   Commonly known as:  NORCO   Dose:  1 tablet   Quantity:   18 tablet        Take 1 tablet by mouth every 4 hours as needed for pain   Refills:  0          Our records show that you are taking the medicines listed below. If these are incorrect, please call your family doctor or clinic.        Dose / Directions Last dose taken    ibuprofen 200 MG capsule   Dose:  800 mg        Take 800 mg by mouth daily   Refills:  0                Information about OPIOIDS     PRESCRIPTION OPIOIDS: WHAT YOU NEED TO KNOW   We gave you an opioid (narcotic) pain medicine. It is important to manage your pain, but opioids are not always the best choice. You should first try all the other options your care team gave you. Take this medicine for as short a time (and as few doses) as possible.    Some activities can increase your pain, such as bandage changes or therapy sessions. It may help to take your pain medicine 30 to 60 minutes before these activities. Reduce your stress by getting enough sleep, working on hobbies you enjoy and practicing relaxation or meditation. Talk to your care team about ways to manage your pain beyond prescription opioids.    These medicines have risks:    DO NOT drive when on new or higher doses of pain medicine. These medicines can affect your alertness and reaction times, and you could be arrested for driving under the influence (DUI). If you need to use opioids long-term, talk to your care team about driving.    DO NOT operate heavy machinery    DO NOT do any other dangerous activities while taking these medicines.    DO NOT drink any alcohol while taking these medicines.     If the opioid prescribed includes acetaminophen, DO NOT take with any other medicines that contain acetaminophen. Read all labels carefully. Look for the word  acetaminophen  or  Tylenol.  Ask your pharmacist if you have questions or are unsure.    You can get addicted to pain medicines, especially if you have a history of addiction (chemical, alcohol or substance dependence). Talk to your care  team about ways to reduce this risk.    All opioids tend to cause constipation. Drink plenty of water and eat foods that have a lot of fiber, such as fruits, vegetables, prune juice, apple juice and high-fiber cereal. Take a laxative (Miralax, milk of magnesia, Colace, Senna) if you don t move your bowels at least every other day. Other side effects include upset stomach, sleepiness, dizziness, throwing up, tolerance (needing more of the medicine to have the same effect), physical dependence and slowed breathing.    Store your pills in a secure place, locked if possible. We will not replace any lost or stolen medicine. If you don t finish your medicine, please throw away (dispose) as directed by your pharmacist. The [place] Pollution Control Agency has more information about safe disposal: https://www.pca.[facility].[place].us/living-green/managing-unwanted-medications        Prescriptions were sent or printed at these locations (1 Prescription)                   [facility] [address]   [address] [address]    Telephone:  [phone]   Fax:  [phone]   Hours:                  Printed at Department/Unit printer (1 of 1)         HYDROcodone-acetaminophen (NORCO) 5-325 MG per tablet                Procedures and tests performed during your visit     EKG 12-lead, tracing only    XR Ribs & Chest Lt 3v      Orders Needing Specimen Collection     None      Pending Results     No orders found from 9/2/2018 to 9/5/2018.            Pending Culture Results     No orders found from 9/2/2018 to 9/5/2018.            Pending Results Instructions     If you had any lab results that were not finalized at the time of your Discharge, you can call the ED Lab Result RN at [phone]. You will be contacted by this team for any positive Lab results or changes in treatment. The nurses are available 7 days a week from 10A to 6:30P.  You can leave a message 24 hours per day and they will  "return your call.        Test Results From Your Hospital Stay        2018  2:54 PM      Narrative     LEFT RIBS AND CHEST THREE VIEWS   2018 2:40 PM     HISTORY: Left periscapular pain.     COMPARISON: None.        Impression     IMPRESSION: Lungs are hypoinflated with basilar opacities favored  represent atelectasis. No displaced rib fractures are identified;  however, oblique views are limited due to technique/habitus.    SHASHA SAUCEDO MD                Thank you for choosing [facility]       Thank you for choosing [facility] for your care. Our goal is always to provide you with excellent care. Hearing back from our patients is one way we can continue to improve our services. Please take a few minutes to complete the written survey that you may receive in the mail after you visit with us. Thank you!        [employer] Information     [employer] lets you send messages to your doctor, view your test results, renew your prescriptions, schedule appointments and more. To sign up, go to [email or web address] . Click on \"Log in\" on the left side of the screen, which will take you to the Welcome page. Then click on \"Sign up Now\" on the right side of the page.     You will be asked to enter the access code listed below, as well as some personal information. Please follow the directions to create your username and password.     Your access code is: [email or web address]  Expires: 12/3/2018  3:24 PM     Your access code will  in 90 days. If you need help or a new code, please call your [facility] clinic or [phone].        Care EveryWhere ID     This is your Care EveryWhere ID. This could be used by other organizations to access your [facility] medical records  [id number]        Equal Access to Services     [facility] : Radha Harper, jeanette cordova, nigel garcia. So [facility] [phone].    ATENCIÓN: Si habla español, tiene a james disposición servicios " sarah de asistencia lingüística. Patrice zhao [phone].    We comply with applicable federal civil rights laws and [place] laws. We do not discriminate on the basis of race, color, national origin, age, disability, sex, sexual orientation, or gender identity.            After Visit Summary       This is your record. Keep this with you and show to your community pharmacist(s) and doctor(s) at your next visit.

## 2019-11-29 ENCOUNTER — TELEPHONE (OUTPATIENT)
Dept: OTHER | Facility: CLINIC | Age: 34
End: 2019-11-29

## 2020-10-14 ENCOUNTER — VIRTUAL VISIT (OUTPATIENT)
Dept: FAMILY MEDICINE | Facility: OTHER | Age: 35
End: 2020-10-14
Payer: COMMERCIAL

## 2020-10-14 PROCEDURE — 99421 OL DIG E/M SVC 5-10 MIN: CPT | Performed by: FAMILY MEDICINE

## 2020-10-14 NOTE — PROGRESS NOTES
"Date: 10/14/2020 10:32:27  Clinician: Jagjit Fishman  Clinician NPI: [id number]  Patient: MEGAN KERN  Patient : 1985  Patient Address: [address] [place] [address]  Patient Phone: [phone]  Visit Protocol: URI  Patient Summary:  MEGAN is a 35 year old ( : 1985 ) male who initiated a [facility] Visit for COVID-19 (Coronavirus) evaluation and screening. When asked the question \"Please sign me up to receive news, health information and promotions from [facility].\", MEGAN responded \"No\".    MEGAN states his symptoms started gradually 3-4 days ago. After his symptoms started, they improved and then got worse again.   His symptoms consist of a headache, a cough, nausea, facial pain or pressure, myalgia, chills, and malaise. MEGAN also feels feverish.   Symptom details     Cough: MEGAN coughs a few times an hour and his cough is not more bothersome at night. Phlegm does not come into his throat when he coughs. He does not believe his cough is caused by post-nasal drip.     Temperature: His current temperature is 96.8 degrees Fahrenheit.     Facial pain or pressure: The facial pain or pressure does not feel worse when bending or leaning forward.     Headache: He states the headache is moderate (4-6 on a 10 point pain scale).      MEGAN denies having ear pain, wheezing, nasal congestion, anosmia, vomiting, rhinitis, sore throat, teeth pain, ageusia, and diarrhea. He also denies taking antibiotic medication in the past month and having recent facial or sinus surgery in the past 60 days. He is not experiencing dyspnea.   Precipitating events  He has not recently been exposed to someone with influenza. MEGAN has not been in close contact with any high risk individuals.   Pertinent COVID-19 (Coronavirus) information  In the past 14 days, MEGAN has not worked in a congregate living setting.   He does not work or volunteer as healthcare worker or a  and does " not work or volunteer in a healthcare facility.   MEGAN also has not lived in a congregate living setting in the past 14 days. He does not live with a healthcare worker.   MEGAN has not had a close contact with a laboratory-confirmed COVID-19 patient within 14 days of symptom onset.   Since December 2019, MEGAN and has not had upper respiratory infection or influenza-like illness. Has not been diagnosed with lab-confirmed COVID-19 test   Pertinent medical history  MEGAN had 1 sinus infection within the past year.   MEGAN needs a return to work/school note.   Weight: 360 lbs   MEGAN smokes or uses smokeless tobacco.   Additional information as reported by the patient (free text): Started Sunday with upset stomach, then Tuesday evening muscle knots all over my body and then extreme shivering, checked temp it was 100.8. 2 hours later it was 102.3. Down now but have been taking ibuprofen and apple cider vinegar mix to try to help   Weight: 360 lbs    MEDICATIONS: No current medications, ALLERGIES: NKDA  Clinician Response:  Dear MEGAN,   Your symptoms show that you may have coronavirus (COVID-19). This illness can cause fever, cough and trouble breathing. Many people get a mild case and get better on their own. Some people can get very sick.  What should I do?  We would like to test you for this virus.   1. Please call [phone] to schedule your visit. Explain that you were referred by [facility] to have a COVID-19 test. Be ready to share your [facility] visit ID number.  The following will serve as your written order for this COVID Test, ordered by me, for the indication of suspected COVID [Z20.828]: The test will be ordered in [employer], our electronic health record, after you are scheduled. It will show as ordered and authorized by Patel Zheng MD.  Order: COVID-19 (Coronavirus) PCR for SYMPTOMATIC testing from [facility].      2. When it's time for your COVID test:  Stay at least 6 feet away from others. (If someone  "will drive you to your test, stay in the backseat, as far away from the  as you can.)   Cover your mouth and nose with a mask, tissue or washcloth.  Go straight to the testing site. Don't make any stops on the way there or back.      3.Starting now: Stay home and away from others (self-isolate) until:   You've had no fever---and no medicine that reduces fever---for one full day (24 hours). And...   Your other symptoms have gotten better. For example, your cough or breathing has improved. And...   At least 10 days have passed since your symptoms started.       During this time, don't leave the house except for testing or medical care.   Stay in your own room, even for meals. Use your own bathroom if you can.   Stay away from others in your home. No hugging, kissing or shaking hands. No visitors.  Don't go to work, school or anywhere else.    Clean \"high touch\" surfaces often (doorknobs, counters, handles, etc.). Use a household cleaning spray or wipes. You'll find a full list of  on the EPA website: www.epa.gov/pesticide-registration/list-n-disinfectants-use-against-sars-cov-2.   Cover your mouth and nose with a mask, tissue or washcloth to avoid spreading germs.  Wash your hands and face often. Use soap and water.  Caregivers in these groups are at risk for severe illness due to COVID-19:  o People 65 years and older  o People who live in a nursing home or long-term care facility  o People with chronic disease (lung, heart, cancer, diabetes, kidney, liver, immunologic)  o People who have a weakened immune system, including those who:   Are in cancer treatment  Take medicine that weakens the immune system, such as corticosteroids  Had a bone marrow or organ transplant  Have an immune deficiency  Have poorly controlled HIV or AIDS  Are obese (body mass index of 40 or higher)  Smoke regularly   o Caregivers should wear gloves while washing dishes, handling laundry and cleaning bedrooms and bathrooms.  o " Use caution when washing and drying laundry: Don't shake dirty laundry, and use the warmest water setting that you can.  o For more tips, go to www.cdc.gov/coronavirus/2019-ncov/downloads/10Things.pdf.    4.Sign up for [employer] [employer]. We know it's scary to hear that you might have COVID-19. We want to track your symptoms to make sure you're okay over the next 2 weeks. Please look for an email from [employer]---this is a free, online program that we'll use to keep in touch. To sign up, follow the link in the email. Learn more at [email or web address]  How can I take care of myself?   Get lots of rest. Drink extra fluids (unless a doctor has told you not to).   Take Tylenol (acetaminophen) for fever or pain. If you have liver or kidney problems, ask your family doctor if it's okay to take Tylenol.   Adults can take either:    650 mg (two 325 mg pills) every 4 to 6 hours, or...   1,000 mg (two 500 mg pills) every 8 hours as needed.    Note: Don't take more than 3,000 mg in one day. Acetaminophen is found in many medicines (both prescribed and over-the-counter medicines). Read all labels to be sure you don't take too much.   For children, check the Tylenol bottle for the right dose. The dose is based on the child's age or weight.    If you have other health problems (like cancer, heart failure, an organ transplant or severe kidney disease): Call your specialty clinic if you don't feel better in the next 2 days.       Know when to call 911. Emergency warning signs include:    Trouble breathing or shortness of breath Pain or pressure in the chest that doesn't go away Feeling confused like you haven't felt before, or not being able to wake up Bluish-colored lips or face.  Where can I get more information?    [employer] -- About COVID-19: [email or web address]   CDC -- What to Do If You're Sick: www.cdc.gov/coronavirus/2019-ncov/about/steps-when-sick.html   CDC -- Ending Home Isolation:  www.cdc.gov/coronavirus/2019-ncov/hcp/disposition-in-home-patients.html   [facility] -- Caring for Someone: www.cdc.gov/coronavirus/2019-ncov/if-you-are-sick/care-for-someone.html   [facility] -- Interim Guidance for Hospital Discharge to Home: www.[facility].[place]/diseases/coronavirus/hcp/hospdischarge.pdf   [facility] clinical trials (COVID-19 research studies): clinicalaffairs.[facility]-clinical-trials    Below are the COVID-19 hotlines at the [place] Department of Health ([facility]). Interpreters are available.    For health questions: Call 111-396-3867 or 1-458.641.5967 (7 a.m. to 7 p.m.) For questions about schools and childcare: Call 486-794-5462 or 1-804.902.7851 (7 a.m. to 7 p.m.)    Diagnosis: Cough  Diagnosis ICD: R05

## 2020-11-14 ENCOUNTER — HEALTH MAINTENANCE LETTER (OUTPATIENT)
Age: 35
End: 2020-11-14

## 2021-09-12 ENCOUNTER — HEALTH MAINTENANCE LETTER (OUTPATIENT)
Age: 36
End: 2021-09-12

## 2022-01-02 ENCOUNTER — HEALTH MAINTENANCE LETTER (OUTPATIENT)
Age: 37
End: 2022-01-02

## 2022-11-14 ENCOUNTER — MYC MEDICAL ADVICE (OUTPATIENT)
Dept: INTERNAL MEDICINE | Facility: CLINIC | Age: 37
End: 2022-11-14

## 2022-11-14 ENCOUNTER — VIRTUAL VISIT (OUTPATIENT)
Dept: INTERNAL MEDICINE | Facility: CLINIC | Age: 37
End: 2022-11-14
Payer: COMMERCIAL

## 2022-11-14 DIAGNOSIS — J11.1 FLU: Primary | ICD-10-CM

## 2022-11-14 PROCEDURE — 99213 OFFICE O/P EST LOW 20 MIN: CPT | Mod: TEL | Performed by: INTERNAL MEDICINE

## 2022-11-14 RX ORDER — OSELTAMIVIR PHOSPHATE 75 MG/1
75 CAPSULE ORAL 2 TIMES DAILY
Qty: 10 CAPSULE | Refills: 0 | Status: SHIPPED | OUTPATIENT
Start: 2022-11-14

## 2022-11-14 RX ORDER — CODEINE PHOSPHATE AND GUAIFENESIN 10; 100 MG/5ML; MG/5ML
1-2 SOLUTION ORAL EVERY 4 HOURS PRN
Qty: 240 ML | Refills: 1 | Status: SHIPPED | OUTPATIENT
Start: 2022-11-14

## 2022-11-14 NOTE — LETTER
November 16, 2022      Zac Real  [address] [address]        To Whom It May Concern:    Zac Real  was seen on 11/14/2022.  Please excuse him from work for November 14th and 15th 2022 due to illness.        Sincerely,        Sandi Jorge MD

## 2022-11-14 NOTE — PROGRESS NOTES
Zac is a 37 year old who is being evaluated via a billable telephone visit.      What phone number would you like to be contacted at? [phone]  How would you like to obtain your AVS? MyChart    Assessment & Plan     Flu  Most likely is influenza A so went ahead and treated with Tamiflu. But strongly encouraged him to do a home Covid test to be sure . Also Usual Conservative treatment recommended.   - oseltamivir (TAMIFLU) 75 MG capsule; Take 1 capsule (75 mg) by mouth 2 times daily  - guaiFENesin-codeine (ROBITUSSIN AC) 100-10 MG/5ML solution; Take 5-10 mLs by mouth every 4 hours as needed for cough         Nicotine/Tobacco Cessation:  He reports that he has been smoking cigarettes, cigars, and dip, chew, snus or snuff. His smokeless tobacco use includes chew.  Nicotine/Tobacco Cessation Plan:   Information offered: Patient not interested at this time        Return in about 1 year (around 11/14/2023).    Sandi Jorge MD  [facility]   Zac is a 37 year old, presenting for the following health issues:  URI      HPI     Cough x 2 days   Head pressure   Temp 99.8   Noted wheezing   Step daughter had influenza A last  week     Denies any fever chills or night sweats. Has no history of asthma but will get occasional wheezing with URIs. wheezing is mild. No shortness of breath or dyspnea on exertion. No orthopnea or PND. No nausea or vomiting. No GI symptoms.     No known allergies     Review of Systems   Constitutional, HEENT, cardiovascular, pulmonary, GI, , musculoskeletal, neuro, skin, endocrine and psych systems are negative, except as otherwise noted.      Objective           Vitals:  No vitals were obtained today due to virtual visit.    Physical Exam   healthy, alert and no distress  PSYCH: Alert and oriented times 3; coherent speech, normal   rate and volume, able to articulate logical thoughts, able   to abstract reason, no tangential thoughts, no  hallucinations   or delusions  His affect is normal  RESP: No cough, no audible wheezing, able to talk in full sentences  Remainder of exam unable to be completed due to telephone visits          Phone call duration: 10 minutes

## 2022-11-16 NOTE — TELEPHONE ENCOUNTER
Patient sends [employer] message asking for a work excuse letter. Call placed to patient to inquire which days needed to be noted. Patient requesting a letter to excuse work for Monday the 14th and Tuesday the 15th.    Patient will print from home.     Letter pended under provider's name.

## 2022-11-19 ENCOUNTER — HEALTH MAINTENANCE LETTER (OUTPATIENT)
Age: 37
End: 2022-11-19

## 2023-04-09 ENCOUNTER — HEALTH MAINTENANCE LETTER (OUTPATIENT)
Age: 38
End: 2023-04-09

## 2024-06-15 ENCOUNTER — HEALTH MAINTENANCE LETTER (OUTPATIENT)
Age: 39
End: 2024-06-15

## 2024-10-28 ENCOUNTER — APPOINTMENT (OUTPATIENT)
Dept: GENERAL RADIOLOGY | Facility: CLINIC | Age: 39
End: 2024-10-28
Attending: NURSE PRACTITIONER
Payer: COMMERCIAL

## 2024-10-28 ENCOUNTER — HOSPITAL ENCOUNTER (EMERGENCY)
Facility: CLINIC | Age: 39
Discharge: HOME | End: 2024-10-28
Attending: NURSE PRACTITIONER | Admitting: NURSE PRACTITIONER
Payer: COMMERCIAL

## 2024-10-28 VITALS
RESPIRATION RATE: 18 BRPM | TEMPERATURE: 98.6 F | DIASTOLIC BLOOD PRESSURE: 90 MMHG | OXYGEN SATURATION: 96 % | SYSTOLIC BLOOD PRESSURE: 154 MMHG | HEART RATE: 84 BPM

## 2024-10-28 DIAGNOSIS — S29.019A THORACIC MYOFASCIAL STRAIN, INITIAL ENCOUNTER: ICD-10-CM

## 2024-10-28 PROCEDURE — 250N000011 HC RX IP 250 OP 636: Performed by: NURSE PRACTITIONER

## 2024-10-28 PROCEDURE — 96372 THER/PROPH/DIAG INJ SC/IM: CPT | Performed by: NURSE PRACTITIONER

## 2024-10-28 PROCEDURE — 72072 X-RAY EXAM THORAC SPINE 3VWS: CPT

## 2024-10-28 PROCEDURE — 72100 X-RAY EXAM L-S SPINE 2/3 VWS: CPT

## 2024-10-28 PROCEDURE — 99214 OFFICE O/P EST MOD 30 MIN: CPT | Performed by: NURSE PRACTITIONER

## 2024-10-28 PROCEDURE — G0463 HOSPITAL OUTPT CLINIC VISIT: HCPCS | Performed by: NURSE PRACTITIONER

## 2024-10-28 RX ORDER — METHYLPREDNISOLONE 4 MG/1
TABLET ORAL
Qty: 21 TABLET | Refills: 0 | Status: SHIPPED | OUTPATIENT
Start: 2024-10-28

## 2024-10-28 RX ORDER — CYCLOBENZAPRINE HCL 10 MG
10 TABLET ORAL 3 TIMES DAILY PRN
Qty: 15 TABLET | Refills: 0 | Status: SHIPPED | OUTPATIENT
Start: 2024-10-28 | End: 2024-11-04

## 2024-10-28 RX ORDER — KETOROLAC TROMETHAMINE 30 MG/ML
30 INJECTION, SOLUTION INTRAMUSCULAR; INTRAVENOUS ONCE
Status: COMPLETED | OUTPATIENT
Start: 2024-10-28 | End: 2024-10-28

## 2024-10-28 RX ADMIN — KETOROLAC TROMETHAMINE 30 MG: 30 INJECTION, SOLUTION INTRAMUSCULAR at 18:11

## 2024-10-28 ASSESSMENT — COLUMBIA-SUICIDE SEVERITY RATING SCALE - C-SSRS
2. HAVE YOU ACTUALLY HAD ANY THOUGHTS OF KILLING YOURSELF IN THE PAST MONTH?: NO
1. IN THE PAST MONTH, HAVE YOU WISHED YOU WERE DEAD OR WISHED YOU COULD GO TO SLEEP AND NOT WAKE UP?: NO
6. HAVE YOU EVER DONE ANYTHING, STARTED TO DO ANYTHING, OR PREPARED TO DO ANYTHING TO END YOUR LIFE?: NO

## 2024-10-28 ASSESSMENT — ACTIVITIES OF DAILY LIVING (ADL): ADLS_ACUITY_SCORE: 0

## 2024-10-28 NOTE — ED PROVIDER NOTES
ED Provider Note  [facility]      History     Chief Complaint   Patient presents with    Back Pain     HPI  Zac Real is a 39 year old male who presents to urgent care for exacerbation of low back pain reports that he has had muscle strain in his low back in the past but reports that this has not been a consistent problem for him and usually resolves without additional treatment.  Reports that he has been trying to be active to prevent stiffness, has been using heat, ice, ibuprofen without significant improvement.  Patient has not taken ibuprofen today last took Tylenol 4 hours ago without improvement.  Denies any loss or change of bowel or bladder function, denies any saddle anesthesia denies any loss or change of sensation in lower extremities.  He reports that he has been at his father moved from [place] to [place] and last Saturday he felt a pulling sensation while lifting boxes.            Allergies:  No Known Allergies    Problem List:    Patient Active Problem List    Diagnosis Date Noted    Elevated blood pressure reading without diagnosis of hypertension 03/10/2015     Priority: Medium    Tobacco abuse 03/10/2015     Priority: Medium    Morbid obesity (H) 03/10/2015     Priority: Medium        Past Medical History:    No past medical history on file.    Past Surgical History:    Past Surgical History:   Procedure Laterality Date    NISSEN FUNDOPLICATION         Family History:    Family History   Problem Relation Age of Onset    Unknown/Adopted Father     Brain Tumor Maternal Grandmother 69    Diabetes Maternal Grandfather        Social History:  Marital Status:   [2]  Social History     Tobacco Use    Smoking status: Some Days     Types: Cigarettes, Cigars, Dip, chew, snus or snuff    Smokeless tobacco: Current     Types: Chew   Vaping Use    Vaping status: Never Used   Substance Use Topics    Alcohol use: Yes     Comment: weekly    Drug use: No         Medications:    cyclobenzaprine (FLEXERIL) 10 MG tablet  methylPREDNISolone (MEDROL DOSEPAK) 4 MG tablet therapy pack  guaiFENesin-codeine (ROBITUSSIN AC) 100-10 MG/5ML solution  ibuprofen 200 MG capsule  oseltamivir (TAMIFLU) 75 MG capsule          Review of Systems  A medically appropriate review of systems was performed with pertinent positives and negatives noted in the HPI, and all other systems negative.    Physical Exam   Patient Vitals for the past 24 hrs:   BP Temp Temp src Pulse Resp SpO2   10/28/24 1821 (!) 154/90 -- -- -- -- --   10/28/24 1807 -- -- -- 84 -- --   10/28/24 1728 (!) 155/104 98.6  F (37  C) Tympanic (!) 123 18 96 %          Physical Exam  General: alert and in acute distress related to pain in his lower back, on arrival  Head: atraumatic, normocephalic  Lungs:  nonlabored, CTA bilateral throughout  CV: Regular rate and rhythm, extremities warm and perfused, no edema in lower extremities.  Abd: nondistended, nontender, no rebound tenderness, no HSM, normal bowel sounds throughout  Musculoskeletal: Has decreased range of motion with turning side-to-side while standing or sitting.  Patient has some improvement of his back pain with changing positions frequently.  Patient does report pain with left and right leg raise.  There is obvious spasming over lower thoracic and lumbar paraspinous muscles.  Skin: no rashes, no diaphoresis and skin color normal  Neuro: Patient awake, alert, speech is fluent, no focal deficits.  Normal fine and gross motor function on exam.  Psychiatric: affect/mood normal, appropriate historian      ED Course                 Procedures             10/28/24 1957  Lumbar spine XR, 2-3 views  Performed: 10/28/24 1849  Final               10/28/24 1957  Thoracic spine XR, 3 views  Performed: 10/28/24 1848  Final              Narrative & Impression   EXAM: XR THORACIC SPINE 3 VIEWS, XR LUMBAR SPINE 2/3 VIEWS  LOCATION: [facility]  DATE:  10/28/2024     INDICATION: pain in lower thoracic area on right side  COMPARISON: None.                                                                      IMPRESSION: Vertebral body heights of the thoracic and lumbar spine are maintained. No evidence of fracture. There is normal spinal alignment. Mild intervertebral disc height loss and endplate degenerative changes at L5-S1. Extraspinal structures are   unremarkable.          No results found for this or any previous visit (from the past 48 hours).    MEDICATIONS GIVEN IN THE EMERGENCY DEPARTMENT:  Medications   ketorolac (TORADOL) injection 30 mg (30 mg Intramuscular $Given 10/28/24 1811)                Assessments & Plan (with Medical Decision Making)  39 year old male who presents to the Urgent Care for evaluation of pain in lower thoracic and lumbar spine reports that he has been stretching twisting and turning over the last several days helping his father move.  He has no saddle anesthesia, loss or change of bowel or bladder function, loss or change of sensation in lower extremities.  X-rays were ordered of lower thoracic and lumbar spine personally viewed these x-rays and reviewed radiology interpretation.  Normal alignment and no fractures identified on x-rays.  Diagnosis: Thoracic myofascial strain, initial encounter  Treatment recommendations and orders: Patient was given Toradol in urgent care today with some improvement of his pain recommend that he not add any other anti-inflammatory medications  for 12 hours to prevent stomach upset.  Ordered Medrol Dosepak to decrease inflammation, cyclobenzaprine to decrease muscle spasm pain which is evident on exam today.  Recommended use of naproxen every 12 hours as needed for pain after the initial 12 hours.  Patient advised to return if he develops changes or loss of bowel or bladder function, change or loss of sensation in lower extremities, or saddle anesthesia symptoms.  Recommend follow-up with his  primary clinic for ongoing low back pain if this is not improving recommend physical therapy consult, PT consult was declined today.  Patient advised not to combine cyclobenzaprine with alcohol or any other central nervous depressant medications, as this could cause drowsiness and potential injuries.      Patient verbalized agreement with and understanding of the rational for the diagnosis and treatment plan.  All questions were answered to best of my ability and the patient's satisfaction. The patient was advised to contact or return to their primary clinic or UC/ED with any questions that may arise after this visit.       I have reviewed the nursing notes.    I have reviewed the findings, diagnosis, plan and need for follow up with the patient.        NEW PRESCRIPTIONS STARTED AT TODAY'S ER VISIT  New Prescriptions    CYCLOBENZAPRINE (FLEXERIL) 10 MG TABLET    Take 1 tablet (10 mg) by mouth 3 times daily as needed for muscle spasms.    METHYLPREDNISOLONE (MEDROL DOSEPAK) 4 MG TABLET THERAPY PACK    Follow Package Directions       Final diagnoses:   Thoracic myofascial strain, initial encounter       10/28/2024   [facility] EMERGENCY DEPT    Disclaimer: This note consists of symbols derived from keyboarding, dictation, and/or voice recognition software. As a result, there may be errors in the script that have gone undetected.  Please consider this when interpreting information found in the chart.      Tatianna Gutierrez, ROB CNP  11/08/24 0712

## 2024-10-28 NOTE — DISCHARGE INSTRUCTIONS
Recommend that you do not take any ibuprofen, Aleve for 12 hours as this can interfere cause stomach upset with the Toradol that was given to you in urgent care.  You can take Tylenol in conjunction with the Toradol that was given if needed.  Cyclobenzaprine is a muscle relaxer that can be taken recommend that you do not combine this with alcohol or any other central nervous depressant medications.  Is important that she do not drive if you are taking cyclobenzaprine as this can cause drowsiness.

## 2025-06-21 ENCOUNTER — HEALTH MAINTENANCE LETTER (OUTPATIENT)
Age: 40
End: 2025-06-21